# Patient Record
Sex: FEMALE | Race: WHITE | NOT HISPANIC OR LATINO | Employment: UNEMPLOYED | URBAN - METROPOLITAN AREA
[De-identification: names, ages, dates, MRNs, and addresses within clinical notes are randomized per-mention and may not be internally consistent; named-entity substitution may affect disease eponyms.]

---

## 2019-05-28 ENCOUNTER — HOSPITAL ENCOUNTER (OUTPATIENT)
Dept: MAMMOGRAPHY | Facility: HOSPITAL | Age: 74
Discharge: HOME | End: 2019-05-28
Attending: INTERNAL MEDICINE

## 2020-10-19 ENCOUNTER — HOSPITAL ENCOUNTER (OUTPATIENT)
Dept: PREADMISSION TESTING | Facility: HOSPITAL | Age: 75
Discharge: HOME | End: 2020-10-19
Attending: INTERNAL MEDICINE

## 2020-10-22 LAB — SARS-COV-2 RNA SPEC QL NAA+PROBE: NOT DETECTED

## 2020-10-23 ENCOUNTER — HOSPITAL ENCOUNTER (OUTPATIENT)
Dept: GASTROENTEROLOGY | Facility: HOSPITAL | Age: 75
Setting detail: HOSPITAL OUTPATIENT SURGERY
Discharge: HOME | End: 2020-10-23
Attending: INTERNAL MEDICINE

## 2021-02-22 ENCOUNTER — HOSPITAL ENCOUNTER (OUTPATIENT)
Dept: VACCINE CLINIC | Facility: HOSPITAL | Age: 76
Discharge: HOME | End: 2021-02-22
Attending: INTERNAL MEDICINE

## 2021-03-16 ENCOUNTER — HOSPITAL ENCOUNTER (OUTPATIENT)
Dept: MAMMOGRAPHY | Facility: HOSPITAL | Age: 76
Discharge: HOME | End: 2021-03-16
Attending: INTERNAL MEDICINE

## 2021-03-25 ENCOUNTER — HOSPITAL ENCOUNTER (OUTPATIENT)
Dept: VACCINE CLINIC | Facility: HOSPITAL | Age: 76
Discharge: HOME | End: 2021-03-25
Attending: INTERNAL MEDICINE

## 2022-05-13 ENCOUNTER — LAB (OUTPATIENT)
Dept: LAB | Facility: HOSPITAL | Age: 77
End: 2022-05-13

## 2022-05-13 ENCOUNTER — TRANSCRIBE ORDERS (OUTPATIENT)
Dept: LAB | Facility: HOSPITAL | Age: 77
End: 2022-05-13

## 2022-05-13 DIAGNOSIS — I10 ESSENTIAL HYPERTENSION, MALIGNANT: ICD-10-CM

## 2022-05-13 DIAGNOSIS — I10 ESSENTIAL HYPERTENSION, MALIGNANT: Primary | ICD-10-CM

## 2022-05-13 LAB
ANION GAP SERPL CALCULATED.3IONS-SCNC: 13 MMOL/L (ref 5–15)
BUN SERPL-MCNC: 20 MG/DL (ref 8–23)
BUN/CREAT SERPL: 19 (ref 7–25)
CALCIUM SPEC-SCNC: 9.6 MG/DL (ref 8.6–10.5)
CHLORIDE SERPL-SCNC: 99 MMOL/L (ref 98–107)
CO2 SERPL-SCNC: 25 MMOL/L (ref 22–29)
CREAT SERPL-MCNC: 1.05 MG/DL (ref 0.57–1)
EGFRCR SERPLBLD CKD-EPI 2021: 55.5 ML/MIN/1.73
GLUCOSE SERPL-MCNC: 95 MG/DL (ref 65–99)
POTASSIUM SERPL-SCNC: 4.7 MMOL/L (ref 3.5–5.2)
SODIUM SERPL-SCNC: 137 MMOL/L (ref 136–145)

## 2022-05-13 PROCEDURE — 36415 COLL VENOUS BLD VENIPUNCTURE: CPT

## 2022-05-13 PROCEDURE — 80048 BASIC METABOLIC PNL TOTAL CA: CPT

## 2023-03-09 ENCOUNTER — TRANSCRIBE ORDERS (OUTPATIENT)
Dept: ADMINISTRATIVE | Facility: HOSPITAL | Age: 78
End: 2023-03-09
Payer: MEDICARE

## 2023-03-09 DIAGNOSIS — Z92.89 HISTORY OF MAMMOGRAPHY, SCREENING: Primary | ICD-10-CM

## 2023-03-10 ENCOUNTER — TRANSCRIBE ORDERS (OUTPATIENT)
Dept: ADMINISTRATIVE | Facility: HOSPITAL | Age: 78
End: 2023-03-10
Payer: MEDICARE

## 2023-03-10 DIAGNOSIS — Z12.31 SCREENING MAMMOGRAM FOR BREAST CANCER: Primary | ICD-10-CM

## 2023-03-13 ENCOUNTER — HOSPITAL ENCOUNTER (OUTPATIENT)
Dept: MAMMOGRAPHY | Facility: HOSPITAL | Age: 78
Discharge: HOME | End: 2023-03-13
Admitting: INTERNAL MEDICINE
Payer: MEDICARE

## 2023-03-13 DIAGNOSIS — Z12.31 SCREENING MAMMOGRAM FOR BREAST CANCER: ICD-10-CM

## 2023-03-13 PROCEDURE — 77067 SCR MAMMO BI INCL CAD: CPT

## 2023-03-13 PROCEDURE — 77063 BREAST TOMOSYNTHESIS BI: CPT

## 2023-10-19 ENCOUNTER — TRANSCRIBE ORDERS (OUTPATIENT)
Dept: ADMINISTRATIVE | Facility: HOSPITAL | Age: 78
End: 2023-10-19
Payer: MEDICARE

## 2023-10-19 DIAGNOSIS — Z13.820 OSTEOPOROSIS SCREENING: Primary | ICD-10-CM

## 2023-12-12 ENCOUNTER — TRANSCRIBE ORDERS (OUTPATIENT)
Dept: ADMINISTRATIVE | Facility: HOSPITAL | Age: 78
End: 2023-12-12
Payer: MEDICARE

## 2023-12-12 DIAGNOSIS — M81.0 AGE-RELATED OSTEOPOROSIS WITHOUT CURRENT PATHOLOGICAL FRACTURE: Primary | ICD-10-CM

## 2023-12-14 ENCOUNTER — HOSPITAL ENCOUNTER (OUTPATIENT)
Dept: BONE DENSITY | Facility: HOSPITAL | Age: 78
Discharge: HOME | End: 2023-12-14
Admitting: INTERNAL MEDICINE
Payer: MEDICARE

## 2023-12-14 DIAGNOSIS — M81.0 AGE-RELATED OSTEOPOROSIS WITHOUT CURRENT PATHOLOGICAL FRACTURE: ICD-10-CM

## 2023-12-14 PROCEDURE — 77080 DXA BONE DENSITY AXIAL: CPT

## 2025-05-10 ENCOUNTER — HOSPITAL ENCOUNTER (INPATIENT)
Facility: HOSPITAL | Age: 80
LOS: 6 days | Discharge: NURSING, REHAB OR LONG-TERM CARE | DRG: 641 | End: 2025-05-16
Attending: EMERGENCY MEDICINE | Admitting: INTERNAL MEDICINE
Payer: MEDICARE

## 2025-05-10 ENCOUNTER — APPOINTMENT (OUTPATIENT)
Dept: GENERAL RADIOLOGY | Facility: HOSPITAL | Age: 80
DRG: 641 | End: 2025-05-10
Payer: MEDICARE

## 2025-05-10 ENCOUNTER — APPOINTMENT (OUTPATIENT)
Dept: CT IMAGING | Facility: HOSPITAL | Age: 80
DRG: 641 | End: 2025-05-10
Payer: MEDICARE

## 2025-05-10 DIAGNOSIS — G61.81 CIDP (CHRONIC INFLAMMATORY DEMYELINATING POLYNEUROPATHY): Primary | ICD-10-CM

## 2025-05-10 DIAGNOSIS — E87.1 HYPONATREMIA: ICD-10-CM

## 2025-05-10 DIAGNOSIS — N17.9 AKI (ACUTE KIDNEY INJURY): ICD-10-CM

## 2025-05-10 DIAGNOSIS — R26.2 DIFFICULTY WALKING: ICD-10-CM

## 2025-05-10 DIAGNOSIS — Z78.9 IMPAIRED MOBILITY AND ADLS: ICD-10-CM

## 2025-05-10 DIAGNOSIS — Z74.09 IMPAIRED MOBILITY AND ADLS: ICD-10-CM

## 2025-05-10 PROBLEM — E87.5 HYPERKALEMIA: Status: ACTIVE | Noted: 2025-05-10

## 2025-05-10 PROBLEM — E86.0 DEHYDRATION: Status: ACTIVE | Noted: 2025-05-10

## 2025-05-10 LAB
ALBUMIN SERPL-MCNC: 3.2 G/DL (ref 3.5–5.2)
ALBUMIN/GLOB SERPL: 0.7 G/DL
ALP SERPL-CCNC: 93 U/L (ref 39–117)
ALT SERPL W P-5'-P-CCNC: 20 U/L (ref 1–33)
ANION GAP SERPL CALCULATED.3IONS-SCNC: 15.9 MMOL/L (ref 5–15)
AST SERPL-CCNC: 24 U/L (ref 1–32)
BACTERIA UR QL AUTO: ABNORMAL /HPF
BASOPHILS # BLD AUTO: 0.06 10*3/MM3 (ref 0–0.2)
BASOPHILS NFR BLD AUTO: 0.5 % (ref 0–1.5)
BILIRUB SERPL-MCNC: 0.8 MG/DL (ref 0–1.2)
BILIRUB UR QL STRIP: NEGATIVE
BUN SERPL-MCNC: 49 MG/DL (ref 8–23)
BUN/CREAT SERPL: 36 (ref 7–25)
CALCIUM SPEC-SCNC: 10.2 MG/DL (ref 8.6–10.5)
CHLORIDE SERPL-SCNC: 88 MMOL/L (ref 98–107)
CK SERPL-CCNC: 75 U/L (ref 20–180)
CLARITY UR: ABNORMAL
CO2 SERPL-SCNC: 22.1 MMOL/L (ref 22–29)
COLOR UR: YELLOW
CREAT SERPL-MCNC: 1.36 MG/DL (ref 0.57–1)
D-LACTATE SERPL-SCNC: 1.2 MMOL/L (ref 0.5–2)
DEPRECATED RDW RBC AUTO: 41.4 FL (ref 37–54)
EGFRCR SERPLBLD CKD-EPI 2021: 40 ML/MIN/1.73
EOSINOPHIL # BLD AUTO: 0.57 10*3/MM3 (ref 0–0.4)
EOSINOPHIL NFR BLD AUTO: 4.8 % (ref 0.3–6.2)
ERYTHROCYTE [DISTWIDTH] IN BLOOD BY AUTOMATED COUNT: 12 % (ref 12.3–15.4)
GEN 5 1HR TROPONIN T REFLEX: 11 NG/L
GLOBULIN UR ELPH-MCNC: 4.3 GM/DL
GLUCOSE SERPL-MCNC: 102 MG/DL (ref 65–99)
GLUCOSE UR STRIP-MCNC: NEGATIVE MG/DL
HCT VFR BLD AUTO: 37.6 % (ref 34–46.6)
HGB BLD-MCNC: 12.7 G/DL (ref 12–15.9)
HGB UR QL STRIP.AUTO: ABNORMAL
HYALINE CASTS UR QL AUTO: ABNORMAL /LPF
IMM GRANULOCYTES # BLD AUTO: 0.22 10*3/MM3 (ref 0–0.05)
IMM GRANULOCYTES NFR BLD AUTO: 1.9 % (ref 0–0.5)
KETONES UR QL STRIP: NEGATIVE
LEUKOCYTE ESTERASE UR QL STRIP.AUTO: ABNORMAL
LYMPHOCYTES # BLD AUTO: 0.77 10*3/MM3 (ref 0.7–3.1)
LYMPHOCYTES NFR BLD AUTO: 6.5 % (ref 19.6–45.3)
MAGNESIUM SERPL-MCNC: 2 MG/DL (ref 1.6–2.4)
MCH RBC QN AUTO: 31.5 PG (ref 26.6–33)
MCHC RBC AUTO-ENTMCNC: 33.8 G/DL (ref 31.5–35.7)
MCV RBC AUTO: 93.3 FL (ref 79–97)
MONOCYTES # BLD AUTO: 1.37 10*3/MM3 (ref 0.1–0.9)
MONOCYTES NFR BLD AUTO: 11.6 % (ref 5–12)
NEUTROPHILS NFR BLD AUTO: 74.7 % (ref 42.7–76)
NEUTROPHILS NFR BLD AUTO: 8.85 10*3/MM3 (ref 1.7–7)
NITRITE UR QL STRIP: NEGATIVE
NRBC BLD AUTO-RTO: 0 /100 WBC (ref 0–0.2)
PH UR STRIP.AUTO: <=5 [PH] (ref 5–8)
PLATELET # BLD AUTO: 256 10*3/MM3 (ref 140–450)
PMV BLD AUTO: 8.7 FL (ref 6–12)
POTASSIUM SERPL-SCNC: 5.3 MMOL/L (ref 3.5–5.2)
PROCALCITONIN SERPL-MCNC: 0.62 NG/ML (ref 0–0.25)
PROT SERPL-MCNC: 7.5 G/DL (ref 6–8.5)
PROT UR QL STRIP: NEGATIVE
QT INTERVAL: 340 MS
QTC INTERVAL: 395 MS
RBC # BLD AUTO: 4.03 10*6/MM3 (ref 3.77–5.28)
RBC # UR STRIP: ABNORMAL /HPF
REF LAB TEST METHOD: ABNORMAL
SODIUM SERPL-SCNC: 126 MMOL/L (ref 136–145)
SP GR UR STRIP: 1.01 (ref 1–1.03)
SQUAMOUS #/AREA URNS HPF: ABNORMAL /HPF
TROPONIN T NUMERIC DELTA: -1 NG/L
TROPONIN T SERPL HS-MCNC: 12 NG/L
TSH SERPL DL<=0.05 MIU/L-ACNC: 1.34 UIU/ML (ref 0.27–4.2)
UROBILINOGEN UR QL STRIP: ABNORMAL
WBC # UR STRIP: ABNORMAL /HPF
WBC NRBC COR # BLD AUTO: 11.84 10*3/MM3 (ref 3.4–10.8)

## 2025-05-10 PROCEDURE — 99285 EMERGENCY DEPT VISIT HI MDM: CPT

## 2025-05-10 PROCEDURE — 84443 ASSAY THYROID STIM HORMONE: CPT | Performed by: INTERNAL MEDICINE

## 2025-05-10 PROCEDURE — 82550 ASSAY OF CK (CPK): CPT

## 2025-05-10 PROCEDURE — 25010000002 HYDROMORPHONE 1 MG/ML SOLUTION: Performed by: INTERNAL MEDICINE

## 2025-05-10 PROCEDURE — 25810000003 SODIUM CHLORIDE 0.9 % SOLUTION

## 2025-05-10 PROCEDURE — 72100 X-RAY EXAM L-S SPINE 2/3 VWS: CPT

## 2025-05-10 PROCEDURE — 25810000003 SODIUM CHLORIDE 0.9 % SOLUTION: Performed by: INTERNAL MEDICINE

## 2025-05-10 PROCEDURE — 81001 URINALYSIS AUTO W/SCOPE: CPT

## 2025-05-10 PROCEDURE — 84145 PROCALCITONIN (PCT): CPT | Performed by: INTERNAL MEDICINE

## 2025-05-10 PROCEDURE — 36415 COLL VENOUS BLD VENIPUNCTURE: CPT

## 2025-05-10 PROCEDURE — 83735 ASSAY OF MAGNESIUM: CPT

## 2025-05-10 PROCEDURE — 84484 ASSAY OF TROPONIN QUANT: CPT

## 2025-05-10 PROCEDURE — 70450 CT HEAD/BRAIN W/O DYE: CPT

## 2025-05-10 PROCEDURE — 85025 COMPLETE CBC W/AUTO DIFF WBC: CPT

## 2025-05-10 PROCEDURE — 80053 COMPREHEN METABOLIC PANEL: CPT

## 2025-05-10 PROCEDURE — 93005 ELECTROCARDIOGRAM TRACING: CPT

## 2025-05-10 PROCEDURE — 81001 URINALYSIS AUTO W/SCOPE: CPT | Performed by: INTERNAL MEDICINE

## 2025-05-10 PROCEDURE — 83605 ASSAY OF LACTIC ACID: CPT | Performed by: INTERNAL MEDICINE

## 2025-05-10 RX ORDER — ACETAMINOPHEN 160 MG/5ML
650 SOLUTION ORAL EVERY 4 HOURS PRN
Status: DISCONTINUED | OUTPATIENT
Start: 2025-05-10 | End: 2025-05-16 | Stop reason: HOSPADM

## 2025-05-10 RX ORDER — BISACODYL 10 MG
10 SUPPOSITORY, RECTAL RECTAL DAILY PRN
Status: DISCONTINUED | OUTPATIENT
Start: 2025-05-10 | End: 2025-05-16 | Stop reason: HOSPADM

## 2025-05-10 RX ORDER — ACETAMINOPHEN 650 MG/1
650 SUPPOSITORY RECTAL EVERY 4 HOURS PRN
Status: DISCONTINUED | OUTPATIENT
Start: 2025-05-10 | End: 2025-05-16 | Stop reason: HOSPADM

## 2025-05-10 RX ORDER — SODIUM CHLORIDE 9 MG/ML
40 INJECTION, SOLUTION INTRAVENOUS AS NEEDED
Status: DISCONTINUED | OUTPATIENT
Start: 2025-05-10 | End: 2025-05-16 | Stop reason: HOSPADM

## 2025-05-10 RX ORDER — AMOXICILLIN 250 MG
2 CAPSULE ORAL 2 TIMES DAILY PRN
Status: DISCONTINUED | OUTPATIENT
Start: 2025-05-10 | End: 2025-05-16 | Stop reason: HOSPADM

## 2025-05-10 RX ORDER — SODIUM CHLORIDE 0.9 % (FLUSH) 0.9 %
10 SYRINGE (ML) INJECTION EVERY 12 HOURS SCHEDULED
Status: DISCONTINUED | OUTPATIENT
Start: 2025-05-10 | End: 2025-05-16 | Stop reason: HOSPADM

## 2025-05-10 RX ORDER — ATENOLOL 25 MG/1
25 TABLET ORAL EVERY 12 HOURS SCHEDULED
Status: DISCONTINUED | OUTPATIENT
Start: 2025-05-10 | End: 2025-05-16 | Stop reason: HOSPADM

## 2025-05-10 RX ORDER — NALOXONE HCL 0.4 MG/ML
0.4 VIAL (ML) INJECTION
Status: DISCONTINUED | OUTPATIENT
Start: 2025-05-10 | End: 2025-05-16 | Stop reason: HOSPADM

## 2025-05-10 RX ORDER — ACETAMINOPHEN 325 MG/1
650 TABLET ORAL EVERY 4 HOURS PRN
Status: DISCONTINUED | OUTPATIENT
Start: 2025-05-10 | End: 2025-05-16 | Stop reason: HOSPADM

## 2025-05-10 RX ORDER — ALUMINA, MAGNESIA, AND SIMETHICONE 2400; 2400; 240 MG/30ML; MG/30ML; MG/30ML
15 SUSPENSION ORAL EVERY 6 HOURS PRN
Status: DISCONTINUED | OUTPATIENT
Start: 2025-05-10 | End: 2025-05-16 | Stop reason: HOSPADM

## 2025-05-10 RX ORDER — POLYETHYLENE GLYCOL 3350 17 G/17G
17 POWDER, FOR SOLUTION ORAL DAILY PRN
Status: DISCONTINUED | OUTPATIENT
Start: 2025-05-10 | End: 2025-05-16 | Stop reason: HOSPADM

## 2025-05-10 RX ORDER — SODIUM CHLORIDE 9 MG/ML
100 INJECTION, SOLUTION INTRAVENOUS CONTINUOUS
Status: ACTIVE | OUTPATIENT
Start: 2025-05-10 | End: 2025-05-12

## 2025-05-10 RX ORDER — ONDANSETRON 2 MG/ML
4 INJECTION INTRAMUSCULAR; INTRAVENOUS EVERY 4 HOURS PRN
Status: DISCONTINUED | OUTPATIENT
Start: 2025-05-10 | End: 2025-05-16 | Stop reason: HOSPADM

## 2025-05-10 RX ORDER — SODIUM CHLORIDE 0.9 % (FLUSH) 0.9 %
10 SYRINGE (ML) INJECTION AS NEEDED
Status: DISCONTINUED | OUTPATIENT
Start: 2025-05-10 | End: 2025-05-16 | Stop reason: HOSPADM

## 2025-05-10 RX ORDER — FAMOTIDINE 20 MG/1
40 TABLET, FILM COATED ORAL DAILY
Status: DISCONTINUED | OUTPATIENT
Start: 2025-05-11 | End: 2025-05-16 | Stop reason: HOSPADM

## 2025-05-10 RX ORDER — BISACODYL 5 MG/1
5 TABLET, DELAYED RELEASE ORAL DAILY PRN
Status: DISCONTINUED | OUTPATIENT
Start: 2025-05-10 | End: 2025-05-16 | Stop reason: HOSPADM

## 2025-05-10 RX ORDER — ALPRAZOLAM 0.25 MG
0.25 TABLET ORAL EVERY 6 HOURS PRN
Status: DISCONTINUED | OUTPATIENT
Start: 2025-05-10 | End: 2025-05-16 | Stop reason: HOSPADM

## 2025-05-10 RX ORDER — TEMAZEPAM 15 MG/1
15 CAPSULE ORAL NIGHTLY PRN
Status: DISCONTINUED | OUTPATIENT
Start: 2025-05-10 | End: 2025-05-16 | Stop reason: HOSPADM

## 2025-05-10 RX ORDER — ENOXAPARIN SODIUM 100 MG/ML
40 INJECTION SUBCUTANEOUS DAILY
Status: DISCONTINUED | OUTPATIENT
Start: 2025-05-11 | End: 2025-05-16 | Stop reason: HOSPADM

## 2025-05-10 RX ORDER — HYDROCODONE BITARTRATE AND ACETAMINOPHEN 5; 325 MG/1; MG/1
1 TABLET ORAL EVERY 6 HOURS PRN
Status: DISPENSED | OUTPATIENT
Start: 2025-05-10 | End: 2025-05-15

## 2025-05-10 RX ADMIN — Medication 10 ML: at 22:57

## 2025-05-10 RX ADMIN — HYDROMORPHONE HYDROCHLORIDE 0.5 MG: 1 INJECTION, SOLUTION INTRAMUSCULAR; INTRAVENOUS; SUBCUTANEOUS at 23:04

## 2025-05-10 RX ADMIN — ATENOLOL 25 MG: 25 TABLET ORAL at 23:31

## 2025-05-10 RX ADMIN — SODIUM CHLORIDE 1000 ML: 9 INJECTION, SOLUTION INTRAVENOUS at 21:15

## 2025-05-10 RX ADMIN — SODIUM CHLORIDE 100 ML/HR: 9 INJECTION, SOLUTION INTRAVENOUS at 22:55

## 2025-05-11 ENCOUNTER — APPOINTMENT (OUTPATIENT)
Dept: MRI IMAGING | Facility: HOSPITAL | Age: 80
DRG: 641 | End: 2025-05-11
Payer: MEDICARE

## 2025-05-11 LAB
ALBUMIN SERPL-MCNC: 2.7 G/DL (ref 3.5–5.2)
ALBUMIN/GLOB SERPL: 0.7 G/DL
ALP SERPL-CCNC: 84 U/L (ref 39–117)
ALT SERPL W P-5'-P-CCNC: 17 U/L (ref 1–33)
ANION GAP SERPL CALCULATED.3IONS-SCNC: 12.4 MMOL/L (ref 5–15)
AST SERPL-CCNC: 18 U/L (ref 1–32)
BACTERIA UR QL AUTO: ABNORMAL /HPF
BASOPHILS # BLD AUTO: 0.06 10*3/MM3 (ref 0–0.2)
BASOPHILS # BLD AUTO: 0.07 10*3/MM3 (ref 0–0.2)
BASOPHILS NFR BLD AUTO: 0.6 % (ref 0–1.5)
BASOPHILS NFR BLD AUTO: 0.7 % (ref 0–1.5)
BILIRUB SERPL-MCNC: 0.6 MG/DL (ref 0–1.2)
BILIRUB UR QL STRIP: NEGATIVE
BUN SERPL-MCNC: 40 MG/DL (ref 8–23)
BUN/CREAT SERPL: 33.3 (ref 7–25)
CALCIUM SPEC-SCNC: 9.4 MG/DL (ref 8.6–10.5)
CHLORIDE SERPL-SCNC: 93 MMOL/L (ref 98–107)
CK SERPL-CCNC: 75 U/L (ref 20–180)
CLARITY UR: CLEAR
CO2 SERPL-SCNC: 22.6 MMOL/L (ref 22–29)
COLOR UR: YELLOW
CREAT SERPL-MCNC: 1.2 MG/DL (ref 0.57–1)
CRP SERPL-MCNC: 45.9 MG/DL (ref 0–0.5)
D DIMER PPP FEU-MCNC: 5.4 MCGFEU/ML (ref 0–0.78)
DEPRECATED RDW RBC AUTO: 42.8 FL (ref 37–54)
DEPRECATED RDW RBC AUTO: 44.2 FL (ref 37–54)
EGFRCR SERPLBLD CKD-EPI 2021: 46.4 ML/MIN/1.73
EOSINOPHIL # BLD AUTO: 0.01 10*3/MM3 (ref 0–0.4)
EOSINOPHIL # BLD AUTO: 0.02 10*3/MM3 (ref 0–0.4)
EOSINOPHIL NFR BLD AUTO: 0.1 % (ref 0.3–6.2)
EOSINOPHIL NFR BLD AUTO: 0.2 % (ref 0.3–6.2)
ERYTHROCYTE [DISTWIDTH] IN BLOOD BY AUTOMATED COUNT: 12.2 % (ref 12.3–15.4)
ERYTHROCYTE [DISTWIDTH] IN BLOOD BY AUTOMATED COUNT: 12.5 % (ref 12.3–15.4)
GLOBULIN UR ELPH-MCNC: 3.8 GM/DL
GLUCOSE SERPL-MCNC: 93 MG/DL (ref 65–99)
GLUCOSE UR STRIP-MCNC: NEGATIVE MG/DL
HCT VFR BLD AUTO: 35.7 % (ref 34–46.6)
HCT VFR BLD AUTO: 36 % (ref 34–46.6)
HGB BLD-MCNC: 11.7 G/DL (ref 12–15.9)
HGB BLD-MCNC: 11.8 G/DL (ref 12–15.9)
HGB UR QL STRIP.AUTO: ABNORMAL
HYALINE CASTS UR QL AUTO: ABNORMAL /LPF
IMM GRANULOCYTES # BLD AUTO: 0.17 10*3/MM3 (ref 0–0.05)
IMM GRANULOCYTES # BLD AUTO: 0.24 10*3/MM3 (ref 0–0.05)
IMM GRANULOCYTES NFR BLD AUTO: 1.6 % (ref 0–0.5)
IMM GRANULOCYTES NFR BLD AUTO: 2.4 % (ref 0–0.5)
KETONES UR QL STRIP: NEGATIVE
LEUKOCYTE ESTERASE UR QL STRIP.AUTO: ABNORMAL
LYMPHOCYTES # BLD AUTO: 0.85 10*3/MM3 (ref 0.7–3.1)
LYMPHOCYTES # BLD AUTO: 0.86 10*3/MM3 (ref 0.7–3.1)
LYMPHOCYTES NFR BLD AUTO: 8.2 % (ref 19.6–45.3)
LYMPHOCYTES NFR BLD AUTO: 8.3 % (ref 19.6–45.3)
MCH RBC QN AUTO: 31.3 PG (ref 26.6–33)
MCH RBC QN AUTO: 31.5 PG (ref 26.6–33)
MCHC RBC AUTO-ENTMCNC: 32.5 G/DL (ref 31.5–35.7)
MCHC RBC AUTO-ENTMCNC: 33.1 G/DL (ref 31.5–35.7)
MCV RBC AUTO: 94.7 FL (ref 79–97)
MCV RBC AUTO: 96.8 FL (ref 79–97)
MONOCYTES # BLD AUTO: 1.31 10*3/MM3 (ref 0.1–0.9)
MONOCYTES # BLD AUTO: 1.42 10*3/MM3 (ref 0.1–0.9)
MONOCYTES NFR BLD AUTO: 12.8 % (ref 5–12)
MONOCYTES NFR BLD AUTO: 13.5 % (ref 5–12)
NEUTROPHILS NFR BLD AUTO: 7.73 10*3/MM3 (ref 1.7–7)
NEUTROPHILS NFR BLD AUTO: 75.8 % (ref 42.7–76)
NEUTROPHILS NFR BLD AUTO: 75.8 % (ref 42.7–76)
NEUTROPHILS NFR BLD AUTO: 8 10*3/MM3 (ref 1.7–7)
NITRITE UR QL STRIP: NEGATIVE
NRBC BLD AUTO-RTO: 0 /100 WBC (ref 0–0.2)
NRBC BLD AUTO-RTO: 0 /100 WBC (ref 0–0.2)
PH UR STRIP.AUTO: <=5 [PH] (ref 5–8)
PLATELET # BLD AUTO: 258 10*3/MM3 (ref 140–450)
PLATELET # BLD AUTO: 271 10*3/MM3 (ref 140–450)
PMV BLD AUTO: 8.7 FL (ref 6–12)
PMV BLD AUTO: 8.8 FL (ref 6–12)
POTASSIUM SERPL-SCNC: 4.8 MMOL/L (ref 3.5–5.2)
PROT SERPL-MCNC: 6.5 G/DL (ref 6–8.5)
PROT UR QL STRIP: NEGATIVE
RBC # BLD AUTO: 3.72 10*6/MM3 (ref 3.77–5.28)
RBC # BLD AUTO: 3.77 10*6/MM3 (ref 3.77–5.28)
RBC # UR STRIP: ABNORMAL /HPF
REF LAB TEST METHOD: ABNORMAL
SODIUM SERPL-SCNC: 128 MMOL/L (ref 136–145)
SP GR UR STRIP: 1.01 (ref 1–1.03)
SQUAMOUS #/AREA URNS HPF: ABNORMAL /HPF
T4 FREE SERPL-MCNC: 1.37 NG/DL (ref 0.92–1.68)
UROBILINOGEN UR QL STRIP: ABNORMAL
WBC # UR STRIP: ABNORMAL /HPF
WBC NRBC COR # BLD AUTO: 10.2 10*3/MM3 (ref 3.4–10.8)
WBC NRBC COR # BLD AUTO: 10.54 10*3/MM3 (ref 3.4–10.8)

## 2025-05-11 PROCEDURE — 86235 NUCLEAR ANTIGEN ANTIBODY: CPT | Performed by: PSYCHIATRY & NEUROLOGY

## 2025-05-11 PROCEDURE — 87798 DETECT AGENT NOS DNA AMP: CPT | Performed by: PSYCHIATRY & NEUROLOGY

## 2025-05-11 PROCEDURE — 25010000002 IMMUNE GLOBULIN (HUMAN) 20 GM/200ML SOLUTION 200 ML VIAL: Performed by: PSYCHIATRY & NEUROLOGY

## 2025-05-11 PROCEDURE — 86038 ANTINUCLEAR ANTIBODIES: CPT | Performed by: PSYCHIATRY & NEUROLOGY

## 2025-05-11 PROCEDURE — 86003 ALLG SPEC IGE CRUDE XTRC EA: CPT | Performed by: PSYCHIATRY & NEUROLOGY

## 2025-05-11 PROCEDURE — 86769 SARS-COV-2 COVID-19 ANTIBODY: CPT | Performed by: PSYCHIATRY & NEUROLOGY

## 2025-05-11 PROCEDURE — 82550 ASSAY OF CK (CPK): CPT | Performed by: PSYCHIATRY & NEUROLOGY

## 2025-05-11 PROCEDURE — 85025 COMPLETE CBC W/AUTO DIFF WBC: CPT | Performed by: PSYCHIATRY & NEUROLOGY

## 2025-05-11 PROCEDURE — 70551 MRI BRAIN STEM W/O DYE: CPT

## 2025-05-11 PROCEDURE — 82378 CARCINOEMBRYONIC ANTIGEN: CPT | Performed by: PSYCHIATRY & NEUROLOGY

## 2025-05-11 PROCEDURE — 72141 MRI NECK SPINE W/O DYE: CPT

## 2025-05-11 PROCEDURE — 85025 COMPLETE CBC W/AUTO DIFF WBC: CPT | Performed by: INTERNAL MEDICINE

## 2025-05-11 PROCEDURE — 86140 C-REACTIVE PROTEIN: CPT | Performed by: PSYCHIATRY & NEUROLOGY

## 2025-05-11 PROCEDURE — 86431 RHEUMATOID FACTOR QUANT: CPT | Performed by: PSYCHIATRY & NEUROLOGY

## 2025-05-11 PROCEDURE — 30233S1 TRANSFUSION OF NONAUTOLOGOUS GLOBULIN INTO PERIPHERAL VEIN, PERCUTANEOUS APPROACH: ICD-10-PCS | Performed by: PSYCHIATRY & NEUROLOGY

## 2025-05-11 PROCEDURE — 25810000003 SODIUM CHLORIDE 0.9 % SOLUTION: Performed by: INTERNAL MEDICINE

## 2025-05-11 PROCEDURE — 25010000002 IMMUNE GLOBULIN (HUMAN) 10 GM/100ML SOLUTION 100 ML VIAL: Performed by: PSYCHIATRY & NEUROLOGY

## 2025-05-11 PROCEDURE — 82784 ASSAY IGA/IGD/IGG/IGM EACH: CPT | Performed by: PSYCHIATRY & NEUROLOGY

## 2025-05-11 PROCEDURE — 80053 COMPREHEN METABOLIC PANEL: CPT | Performed by: INTERNAL MEDICINE

## 2025-05-11 PROCEDURE — 86664 EPSTEIN-BARR NUCLEAR ANTIGEN: CPT | Performed by: PSYCHIATRY & NEUROLOGY

## 2025-05-11 PROCEDURE — 25810000003 SODIUM CHLORIDE 0.9 % SOLUTION 500 ML FLEX CONT: Performed by: INTERNAL MEDICINE

## 2025-05-11 PROCEDURE — 83655 ASSAY OF LEAD: CPT | Performed by: PSYCHIATRY & NEUROLOGY

## 2025-05-11 PROCEDURE — 87484 EHRLICHA CHAFFEENSIS AMP PRB: CPT | Performed by: PSYCHIATRY & NEUROLOGY

## 2025-05-11 PROCEDURE — 82607 VITAMIN B-12: CPT | Performed by: PSYCHIATRY & NEUROLOGY

## 2025-05-11 PROCEDURE — 86593 SYPHILIS TEST NON-TREP QUANT: CPT | Performed by: PSYCHIATRY & NEUROLOGY

## 2025-05-11 PROCEDURE — 86611 BARTONELLA ANTIBODY: CPT | Performed by: PSYCHIATRY & NEUROLOGY

## 2025-05-11 PROCEDURE — 25010000002 HYDROMORPHONE 1 MG/ML SOLUTION: Performed by: INTERNAL MEDICINE

## 2025-05-11 PROCEDURE — 72148 MRI LUMBAR SPINE W/O DYE: CPT

## 2025-05-11 PROCEDURE — 82175 ASSAY OF ARSENIC: CPT | Performed by: PSYCHIATRY & NEUROLOGY

## 2025-05-11 PROCEDURE — 63710000001 DIPHENHYDRAMINE PER 50 MG: Performed by: PSYCHIATRY & NEUROLOGY

## 2025-05-11 PROCEDURE — 86757 RICKETTSIA ANTIBODY: CPT | Performed by: PSYCHIATRY & NEUROLOGY

## 2025-05-11 PROCEDURE — 25010000002 ENOXAPARIN PER 10 MG: Performed by: INTERNAL MEDICINE

## 2025-05-11 PROCEDURE — 87469 BABESIA MICROTI AMP PRB: CPT | Performed by: PSYCHIATRY & NEUROLOGY

## 2025-05-11 PROCEDURE — 83825 ASSAY OF MERCURY: CPT | Performed by: PSYCHIATRY & NEUROLOGY

## 2025-05-11 PROCEDURE — 84439 ASSAY OF FREE THYROXINE: CPT | Performed by: PSYCHIATRY & NEUROLOGY

## 2025-05-11 PROCEDURE — 85379 FIBRIN DEGRADATION QUANT: CPT | Performed by: PSYCHIATRY & NEUROLOGY

## 2025-05-11 PROCEDURE — 72146 MRI CHEST SPINE W/O DYE: CPT

## 2025-05-11 RX ORDER — FAMOTIDINE 10 MG/ML
20 INJECTION, SOLUTION INTRAVENOUS DAILY PRN
Status: ACTIVE | OUTPATIENT
Start: 2025-05-11 | End: 2025-05-15

## 2025-05-11 RX ORDER — PHENOL 1.4 %
600 AEROSOL, SPRAY (ML) MUCOUS MEMBRANE DAILY
COMMUNITY

## 2025-05-11 RX ORDER — ATENOLOL 25 MG/1
50 TABLET ORAL NIGHTLY
COMMUNITY
End: 2025-05-16 | Stop reason: HOSPADM

## 2025-05-11 RX ORDER — ATENOLOL 25 MG/1
25 TABLET ORAL EVERY MORNING
COMMUNITY
End: 2025-05-16 | Stop reason: HOSPADM

## 2025-05-11 RX ORDER — HYDROXYCHLOROQUINE SULFATE 200 MG/1
200 TABLET, FILM COATED ORAL DAILY
Status: DISCONTINUED | OUTPATIENT
Start: 2025-05-11 | End: 2025-05-16 | Stop reason: HOSPADM

## 2025-05-11 RX ORDER — HYDROXYCHLOROQUINE SULFATE 200 MG/1
200 TABLET, FILM COATED ORAL DAILY
COMMUNITY

## 2025-05-11 RX ORDER — CALCIUM CARBONATE/VITAMIN D3 600 MG-10
1 TABLET ORAL 2 TIMES DAILY WITH MEALS
Status: DISCONTINUED | OUTPATIENT
Start: 2025-05-11 | End: 2025-05-16 | Stop reason: HOSPADM

## 2025-05-11 RX ORDER — DIPHENHYDRAMINE HYDROCHLORIDE 50 MG/ML
50 INJECTION, SOLUTION INTRAMUSCULAR; INTRAVENOUS DAILY PRN
Status: ACTIVE | OUTPATIENT
Start: 2025-05-11 | End: 2025-05-15

## 2025-05-11 RX ORDER — DEXAMETHASONE SODIUM PHOSPHATE 10 MG/ML
10 INJECTION, SOLUTION INTRAMUSCULAR; INTRAVENOUS DAILY PRN
Status: ACTIVE | OUTPATIENT
Start: 2025-05-11 | End: 2025-05-15

## 2025-05-11 RX ORDER — ACETAMINOPHEN 325 MG/1
650 TABLET ORAL DAILY
Status: COMPLETED | OUTPATIENT
Start: 2025-05-11 | End: 2025-05-14

## 2025-05-11 RX ORDER — CHOLECALCIFEROL (VITAMIN D3) 25 MCG
1000 TABLET ORAL
COMMUNITY

## 2025-05-11 RX ORDER — AMLODIPINE BESYLATE 5 MG/1
5 TABLET ORAL DAILY
COMMUNITY

## 2025-05-11 RX ORDER — DIPHENHYDRAMINE HCL 25 MG
25 CAPSULE ORAL DAILY
Status: COMPLETED | OUTPATIENT
Start: 2025-05-11 | End: 2025-05-14

## 2025-05-11 RX ORDER — SENNOSIDES 8.6 MG
650 CAPSULE ORAL 2 TIMES DAILY
COMMUNITY

## 2025-05-11 RX ORDER — CYCLOBENZAPRINE HCL 5 MG
2.5 TABLET ORAL NIGHTLY
COMMUNITY
Start: 2025-05-07

## 2025-05-11 RX ORDER — MULTIPLE VITAMINS W/ MINERALS TAB 9MG-400MCG
1 TAB ORAL DAILY
COMMUNITY

## 2025-05-11 RX ORDER — LISINOPRIL 40 MG/1
1 TABLET ORAL DAILY
COMMUNITY
Start: 2025-05-06

## 2025-05-11 RX ORDER — SPIRONOLACTONE 25 MG/1
0.5 TABLET ORAL DAILY
COMMUNITY
Start: 2025-03-02 | End: 2025-05-16 | Stop reason: HOSPADM

## 2025-05-11 RX ADMIN — HYDROCODONE BITARTRATE AND ACETAMINOPHEN 1 TABLET: 5; 325 TABLET ORAL at 05:31

## 2025-05-11 RX ADMIN — DIPHENHYDRAMINE HYDROCHLORIDE 25 MG: 25 CAPSULE ORAL at 18:46

## 2025-05-11 RX ADMIN — ATENOLOL 25 MG: 25 TABLET ORAL at 21:54

## 2025-05-11 RX ADMIN — HYDROMORPHONE HYDROCHLORIDE 0.5 MG: 1 INJECTION, SOLUTION INTRAMUSCULAR; INTRAVENOUS; SUBCUTANEOUS at 15:31

## 2025-05-11 RX ADMIN — ENOXAPARIN SODIUM 40 MG: 100 INJECTION SUBCUTANEOUS at 08:31

## 2025-05-11 RX ADMIN — HYDROXYCHLOROQUINE SULFATE 200 MG: 200 TABLET ORAL at 14:37

## 2025-05-11 RX ADMIN — FAMOTIDINE 40 MG: 20 TABLET, FILM COATED ORAL at 08:31

## 2025-05-11 RX ADMIN — ACETAMINOPHEN 650 MG: 325 TABLET ORAL at 18:46

## 2025-05-11 RX ADMIN — Medication 1 TABLET: at 18:46

## 2025-05-11 RX ADMIN — SODIUM CHLORIDE 40 ML: 9 INJECTION, SOLUTION INTRAVENOUS at 20:04

## 2025-05-11 RX ADMIN — Medication 10 ML: at 08:32

## 2025-05-11 RX ADMIN — Medication 10 ML: at 20:03

## 2025-05-11 RX ADMIN — IMMUNE GLOBULIN (HUMAN) 30 G: 10 INJECTION INTRAVENOUS; SUBCUTANEOUS at 20:04

## 2025-05-11 RX ADMIN — HYDROMORPHONE HYDROCHLORIDE 0.5 MG: 1 INJECTION, SOLUTION INTRAMUSCULAR; INTRAVENOUS; SUBCUTANEOUS at 12:52

## 2025-05-11 RX ADMIN — SODIUM CHLORIDE 100 ML/HR: 9 INJECTION, SOLUTION INTRAVENOUS at 10:44

## 2025-05-11 RX ADMIN — HYDROMORPHONE HYDROCHLORIDE 0.5 MG: 1 INJECTION, SOLUTION INTRAMUSCULAR; INTRAVENOUS; SUBCUTANEOUS at 02:23

## 2025-05-11 NOTE — ED PROVIDER NOTES
Time: 9:38 PM EDT  Date of encounter:  5/10/2025  Independent Historian/Clinical History and Information was obtained by:   Patient    History is limited by: N/A    Chief Complaint: muscle weakness/cramps      History of Present Illness:  Patient is a 79 y.o. year old female who presents to the emergency department for evaluation of bilateral lower extremity cramping and weakness that began 3 days ago.  Patient states she was diagnosed with UTI last Thursday and started on antibiotics.  Patient denies dysuria at this time.  Patient states  started her on a muscle relaxer but it has not helped.      Patient Care Team  Primary Care Provider: Fredy Lopez MD    Past Medical History:     No Known Allergies  Past Medical History:   Diagnosis Date    High blood pressure      Past Surgical History:   Procedure Laterality Date    APPENDECTOMY      CHOLECYSTECTOMY      COLONOSCOPY  2004    Dr. Watters    HYSTERECTOMY       Family History   Problem Relation Age of Onset    Cancer Brother         passed away from cancer unknown    Crohn's disease Brother         brother passed away from crohns disease       Home Medications:  Prior to Admission medications    Not on File        Social History:   Social History     Tobacco Use    Smoking status: Never         Review of Systems:  Review of Systems   Constitutional:  Negative for chills and fever.   HENT:  Negative for congestion, rhinorrhea and sore throat.    Eyes:  Negative for pain and visual disturbance.   Respiratory:  Negative for apnea, cough, chest tightness and shortness of breath.    Cardiovascular:  Negative for chest pain and palpitations.   Gastrointestinal:  Negative for abdominal pain, diarrhea, nausea and vomiting.   Genitourinary:  Negative for difficulty urinating and dysuria.   Musculoskeletal:  Positive for myalgias. Negative for joint swelling.   Skin:  Negative for color change.   Neurological:  Negative for seizures and headaches.  "  Psychiatric/Behavioral: Negative.     All other systems reviewed and are negative.       Physical Exam:  /65 (BP Location: Right arm, Patient Position: Lying)   Pulse 81   Temp 97.8 °F (36.6 °C)   Resp 17   Ht 167.6 cm (66\")   Wt 86.3 kg (190 lb 4.1 oz)   SpO2 96%   BMI 30.71 kg/m²     Physical Exam  Vitals and nursing note reviewed.   Constitutional:       General: She is not in acute distress.     Appearance: Normal appearance. She is not toxic-appearing.   HENT:      Head: Normocephalic and atraumatic.      Jaw: There is normal jaw occlusion.   Eyes:      General: Lids are normal.      Extraocular Movements: Extraocular movements intact.      Conjunctiva/sclera: Conjunctivae normal.      Pupils: Pupils are equal, round, and reactive to light.   Cardiovascular:      Rate and Rhythm: Normal rate and regular rhythm.      Pulses: Normal pulses.      Heart sounds: Normal heart sounds.   Pulmonary:      Effort: Pulmonary effort is normal. No respiratory distress.      Breath sounds: Normal breath sounds. No wheezing or rhonchi.   Abdominal:      General: Abdomen is flat.      Palpations: Abdomen is soft.      Tenderness: There is no abdominal tenderness. There is no guarding or rebound.   Musculoskeletal:         General: Normal range of motion.      Cervical back: Normal range of motion and neck supple.      Right lower leg: No edema.      Left lower leg: No edema.   Skin:     General: Skin is warm and dry.   Neurological:      Mental Status: She is alert and oriented to person, place, and time. Mental status is at baseline.   Psychiatric:         Mood and Affect: Mood normal.                    Medical Decision Making:      Comorbidities that affect care:    Hypertension    External Notes reviewed:          The following orders were placed and all results were independently analyzed by me:  Orders Placed This Encounter   Procedures    CT Head Without Contrast    XR Spine Lumbar 2 or 3 View    " Comprehensive Metabolic Panel    Magnesium    Urinalysis With Microscopic If Indicated (No Culture) - Urine, Clean Catch    CK    CBC Auto Differential    High Sensitivity Troponin T    High Sensitivity Troponin T 1Hr    Code Status and Medical Interventions: CPR (Attempt to Resuscitate); Full Support    Inpatient Hospitalist Consult    ECG 12 Lead Other; diffuse weakness    Inpatient Admission    CBC & Differential       Medications Given in the Emergency Department:  Medications   sodium chloride 0.9 % bolus 1,000 mL (1,000 mL Intravenous New Bag 5/10/25 2115)        ED Course:         Labs:    Lab Results (last 24 hours)       Procedure Component Value Units Date/Time    Comprehensive Metabolic Panel [[id number]]  (Abnormal) Collected: 05/10/25 1939    Specimen: Blood Updated: 05/10/25 2010     Glucose 102 mg/dL      BUN 49 mg/dL      Creatinine 1.36 mg/dL      Sodium 126 mmol/L      Potassium 5.3 mmol/L      Comment: Slight hemolysis detected by analyzer. Result may be falsely elevated.        Chloride 88 mmol/L      CO2 22.1 mmol/L      Calcium 10.2 mg/dL      Total Protein 7.5 g/dL      Albumin 3.2 g/dL      ALT (SGPT) 20 U/L      AST (SGOT) 24 U/L      Comment: Slight hemolysis detected by analyzer. Result may be falsely elevated.        Alkaline Phosphatase 93 U/L      Total Bilirubin 0.8 mg/dL      Globulin 4.3 gm/dL      A/G Ratio 0.7 g/dL      BUN/Creatinine Ratio 36.0     Anion Gap 15.9 mmol/L      eGFR 40.0 mL/min/1.73     Narrative:      GFR Categories in Chronic Kidney Disease (CKD)              GFR Category          GFR (mL/min/1.73)    Interpretation  G1                    90 or greater        Normal or high (1)  G2                    60-89                Mild decrease (1)  G3a                   45-59                Mild to moderate decrease  G3b                   30-44                Moderate to severe decrease  G4                    15-29                Severe decrease  G5                    14 or  less           Kidney failure    (1)In the absence of evidence of kidney disease, neither GFR category G1 or G2 fulfill the criteria for CKD.    eGFR calculation 2021 CKD-EPI creatinine equation, which does not include race as a factor    CBC & Differential [[id number]]  (Abnormal) Collected: 05/10/25 1939    Specimen: Blood Updated: 05/10/25 1951    Narrative:      The following orders were created for panel order CBC & Differential.  Procedure                               Abnormality         Status                     ---------                               -----------         ------                     CBC Auto Differential[[id number]]        Abnormal            Final result                 Please view results for these tests on the individual orders.    Magnesium [[id number]]  (Normal) Collected: 05/10/25 1939    Specimen: Blood Updated: 05/10/25 2010     Magnesium 2.0 mg/dL     CK [[id number]]  (Normal) Collected: 05/10/25 1939    Specimen: Blood Updated: 05/10/25 2010     Creatine Kinase 75 U/L     CBC Auto Differential [[id number]]  (Abnormal) Collected: 05/10/25 1939    Specimen: Blood Updated: 05/10/25 1951     WBC 11.84 10*3/mm3      RBC 4.03 10*6/mm3      Hemoglobin 12.7 g/dL      Hematocrit 37.6 %      MCV 93.3 fL      MCH 31.5 pg      MCHC 33.8 g/dL      RDW 12.0 %      RDW-SD 41.4 fl      MPV 8.7 fL      Platelets 256 10*3/mm3      Neutrophil % 74.7 %      Lymphocyte % 6.5 %      Monocyte % 11.6 %      Eosinophil % 4.8 %      Basophil % 0.5 %      Immature Grans % 1.9 %      Neutrophils, Absolute 8.85 10*3/mm3      Lymphocytes, Absolute 0.77 10*3/mm3      Monocytes, Absolute 1.37 10*3/mm3      Eosinophils, Absolute 0.57 10*3/mm3      Basophils, Absolute 0.06 10*3/mm3      Immature Grans, Absolute 0.22 10*3/mm3      nRBC 0.0 /100 WBC     High Sensitivity Troponin T [[id number]]  (Normal) Collected: 05/10/25 1939    Specimen: Blood Updated: 05/10/25 2010     HS Troponin T 12 ng/L     Narrative:      High  Sensitive Troponin T Reference Range:  <14.0 ng/L- Negative Female for AMI  <22.0 ng/L- Negative Male for AMI  >=14 - Abnormal Female indicating possible myocardial injury.  >=22 - Abnormal Male indicating possible myocardial injury.   Clinicians would have to utilize clinical acumen, EKG, Troponin, and serial changes to determine if it is an Acute Myocardial Infarction or myocardial injury due to an underlying chronic condition.         High Sensitivity Troponin T 1Hr [[id number]] Collected: 05/10/25 2135    Specimen: Blood Updated: 05/10/25 2137             Imaging:    CT Head Without Contrast  Result Date: 5/10/2025  CT HEAD WO CONTRAST HISTORY: Headache and weakness. TECHNIQUE: Axial unenhanced head CT with multiplanar reformats. Radiation dose reduction techniques included automated exposure control or exposure modulation based on body size. COMPARISON: None. FINDINGS: Ventricular size and configuration are normal. No acute infarct or hemorrhage is identified. There are no masses. There is no skull fracture. There is mild age-appropriate atrophy. Chronic small vessel ischemic changes are present in the white matter. Atherosclerotic calcifications are noted in the vertebral arteries and carotid siphons. 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     Senescent changes without acute abnormality. Electronically Signed: Laron Cruz MD  5/10/2025 9:20 PM EDT  Workstation ID: [id number]    XR Spine Lumbar 2 or 3 View  Result Date: 5/10/2025  XR SPINE LUMBAR 2 OR 3 VW Date of Exam: 5/10/2025 8:02 PM EDT Indication: pain Comparison: None available. Findings: Cholecystectomy clips are present. There is grade 1 anterolisthesis of L4 on L5 and L5 on S1. Alignment is otherwise normal. There is mild endplate spurring at multiple levels. There is mild disc space narrowing at L5-S1 and at L1-2 and L2-3. There is multilevel facet arthropathy, particularly in the lower half of the lumbar spine. No acute fractures. No bone erosion or  destruction. Note is also made of atherosclerotic disease.     Multilevel degenerative disease and spondylolisthesis as above. No acute findings. Electronically Signed: Laron Cruz MD  5/10/2025 8:17 PM EDT  Workstation ID: [id number]        Differential Diagnosis and Discussion:    Myalgia: Differential diagnosis includes but is not limited to muscle overuse or strain, infections, inflammatory conditions, autoimmune diseases, medications, metabolic disorders, fibromyalgia, vascular disorders, neurological conditions, psychological factors, toxins, and muscle disorders.    PROCEDURES:    Labs were collected in the emergency department and all labs were reviewed and interpreted by me.  X-ray were performed in the emergency department and all X-ray impressions were independently interpreted by me.  An EKG was performed and the EKG was interpreted by supervising attending.  CT scan was performed in the emergency department and the CT scan radiology impression was interpreted by me.    ECG 12 Lead Other; diffuse weakness   Final Result   HEART RATE=81  bpm   RR Mqgpnple=115  ms   AK Qkhztivy=376  ms   P Horizontal Axis=171  deg   P Front Axis=Invalid  deg   QRSD Interval=97  ms   QT Klctjtyi=689  ms   FPnM=797  ms   QRS Axis=-18  deg   T Wave Axis=3  deg   - ABNORMAL ECG -   Sinus rhythm   Nonspecific  T abnormalities, inferior leads   When compared with ECG of 01-Jul-2017 06:50:09,   Significant repolarization change   Significant axis, voltage or hypertrophy change   Electronically Signed By: Kev Vogel ([facility]) 2025-05-10 20:14:35   Date and Time of Study:2025-05-10 19:45:50          Procedures    MDM     Amount and/or Complexity of Data Reviewed  Clinical lab tests: reviewed  Tests in the radiology section of CPT®: reviewed  Tests in the medicine section of CPT®: reviewed  Decide to obtain previous medical records or to obtain history from someone other than the patient: yes         Patient has hyponatremia and an  DEVORAH.  I started with a liter of fluids and spoke with hospitalist Dr. Lopez who agrees to admit patient.              Patient Care Considerations:          Consultants/Shared Management Plan:    spoke with hospitalist Dr. Lopez who agrees to admit patient.    Social Determinants of Health:          Disposition and Care Coordination:    Admit:   Through independent evaluation of the patient's history, physical, and imperical data, the patient meets criteria for inpatient admission to the hospital.        Final diagnoses:   Hyponatremia   DEVORAH (acute kidney injury)        ED Disposition       ED Disposition   Decision to Admit    Condition   --    Comment   Level of Care: Med/Surg [1]   Diagnosis: Hyponatremia [601259]   Admitting Physician: GALLO LOPEZ [[id number]]   Attending Physician: GALLO LOPEZ [[id number]]   Certification: I Certify That Inpatient Hospital Services Are Medically Necessary For Greater Than 2 Midnights                 This medical record created using voice recognition software.             Robert Moreland PA-C  05/10/25 [id number]

## 2025-05-11 NOTE — PLAN OF CARE
Goal Outcome Evaluation:  Plan of Care Reviewed With: patient        Progress: no change  Outcome Evaluation: Admitted to unit due to hyponatremia and weakness in lower extremeties, A & O x 4, C/O pain in lower legs with cramping, pain medication given as ordered PRN. IV fluids NS infusing at 100 mL/hr. per order. One-assist to BSC, fall precautions in place, and education on prevention, call button within reach. Appears to be sleeping without discomfort being noted during this time.

## 2025-05-11 NOTE — PROGRESS NOTES
[facility]     Progress Note    Patient Name: Bettye France  : 1945  MRN: [id number]  Primary Care Physician:  Fredy Lopez MD  Date of admission: 5/10/2025      Subjective   Brief summary.  Patient admitted with weakness hyponatremia and dehydration      HPI:  Feeling slightly better.  Cramps and twitching better.  Continues to have back pain, restlessness decreased sleep and weakness       Review of Systems      Fatigue and weakness, no chest pain no shortness of breath.  Back pain  No fever no urinary symptoms      Objective     Vitals:   Temp:  [97.8 °F (36.6 °C)-98.2 °F (36.8 °C)] 97.9 °F (36.6 °C)  Heart Rate:  [73-84] 73  Resp:  [16-17] 16  BP: ()/(51-65) 98/51    Physical Exam :     Elderly female not in acute distress.  No icterus.  Heart regular.  Lungs clear.  Abdomen soft and obese nontender.  Extremities no edema.  Neuro awake alert and oriented, generalized weakness and difficulty getting up and walking      Result Review:  I have personally reviewed the results from the time of this admission to 2025 11:26 EDT and agree with these findings:  [x]  Laboratory  []  Microbiology  []  Radiology  []  EKG/Telemetry   []  Cardiology/Vascular   []  Pathology  []  Old records  []  Other:  MRI pending         Assessment / Plan       Active Hospital Problems:  Active Hospital Problems    Diagnosis     **Hyponatremia     Dehydration     Hyperkalemia    Back pain    Plan:   Sodium slightly better.  Generalized weakness and muscle twitching and tremors improving  Restart essential home meds  Consult Dr. Caceres for tremors  MRI pending  PT OT consulted       VTE Prophylaxis:  Pharmacologic VTE prophylaxis orders are present.        CODE STATUS:   Code Status (Patient has no pulse and is not breathing): CPR (Attempt to Resuscitate)  Medical Interventions (Patient has pulse or is breathing): Full Support          Fredy Lopez MD 25 11:26 EDT

## 2025-05-11 NOTE — H&P
[facility]   HISTORY AND PHYSICAL    Patient Name: Bettye France  : 1945  MRN: [id number]  Primary Care Physician:  Fredy Lopez MD  Date of admission: 5/10/2025    Subjective   Subjective     Chief Complaint:   Weakness and muscle cramps      HPI:    Bettye France is a 79 y.o. female presented to ER with weakness and leg cramps.  Patient called me earlier few days ago with back pain and muscle cramps and was given Flexeril subsequently she was also started on antibiotics for UTI for her symptoms.  Patient says nothing else.  She is weak difficulty walking cramps in the legs.  Workup in the ER revealed hyponatremia and dehydration.  When I saw patient in room 32 patient complains of tremors also.  This all started almost 10 days ago.  Patient was seen in [facility] clinic for UTI and was given Levaquin.  Patient's  and son present        Review of Systems:    Fatigue and weakness.  Muscle cramps and leg cramps.  Chronic back pain.  No chest pain or shortness of breath.  No abdominal pain.  Denies any nausea vomiting  No urinary symptoms.      Personal History     Past Medical History:   Diagnosis Date    High blood pressure        Past Surgical History:   Procedure Laterality Date    APPENDECTOMY      CHOLECYSTECTOMY      COLONOSCOPY      Dr. Watters    HYSTERECTOMY         Family History: family history includes Cancer in her brother; Crohn's disease in her brother. Otherwise pertinent FHx was reviewed and not pertinent to current issue.    Social History:  reports that she has never smoked. She does not have any smokeless tobacco history on file.    Home Medications:     Not updated yet by ER or pharmacy tech    Allergies:  No Known Allergies    Objective   Objective     Vitals:   Temp:  [97.8 °F (36.6 °C)] 97.8 °F (36.6 °C)  Heart Rate:  [81] 81  Resp:  [17] 17  BP: (151)/(65) 151/65    Physical Exam    Elderly female obese not in acute distress.    Oral mucosa dry.  Heart regular.   Lungs clear.  Abdomen soft.  Bowel sounds present.  Extremity with trace of edema  No swelling or calf tenderness.  No warmth.  Minimal tremors in the upper extremities noted      I have personally reviewed the results from the time of this admission to 5/10/2025 21:47 EDT and agree with these findings:  [x]  Laboratory  []  Microbiology  [x]  Radiology  [x]  EKG/Telemetry   []  Cardiology/Vascular   []  Pathology  []  Old records  []  Other:    CBC:    WBC   Date Value Ref Range Status   05/10/2025 11.84 (H) 3.40 - 10.80 10*3/mm3 Final     RBC   Date Value Ref Range Status   05/10/2025 4.03 3.77 - 5.28 10*6/mm3 Final     Hemoglobin   Date Value Ref Range Status   05/10/2025 12.7 12.0 - 15.9 g/dL Final     Hematocrit   Date Value Ref Range Status   05/10/2025 37.6 34.0 - 46.6 % Final     MCV   Date Value Ref Range Status   05/10/2025 93.3 79.0 - 97.0 fL Final     MCH   Date Value Ref Range Status   05/10/2025 31.5 26.6 - 33.0 pg Final     MCHC   Date Value Ref Range Status   05/10/2025 33.8 31.5 - 35.7 g/dL Final     RDW   Date Value Ref Range Status   05/10/2025 12.0 (L) 12.3 - 15.4 % Final     RDW-SD   Date Value Ref Range Status   05/10/2025 41.4 37.0 - 54.0 fl Final     MPV   Date Value Ref Range Status   05/10/2025 8.7 6.0 - 12.0 fL Final     Platelets   Date Value Ref Range Status   05/10/2025 256 140 - 450 10*3/mm3 Final     Neutrophil %   Date Value Ref Range Status   05/10/2025 74.7 42.7 - 76.0 % Final     Lymphocyte %   Date Value Ref Range Status   05/10/2025 6.5 (L) 19.6 - 45.3 % Final     Monocyte %   Date Value Ref Range Status   05/10/2025 11.6 5.0 - 12.0 % Final     Eosinophil %   Date Value Ref Range Status   05/10/2025 4.8 0.3 - 6.2 % Final     Basophil %   Date Value Ref Range Status   05/10/2025 0.5 0.0 - 1.5 % Final     Immature Grans %   Date Value Ref Range Status   05/10/2025 1.9 (H) 0.0 - 0.5 % Final     Neutrophils, Absolute   Date Value Ref Range Status   05/10/2025 8.85 (H) 1.70 - 7.00  10*3/mm3 Final     Lymphocytes, Absolute   Date Value Ref Range Status   05/10/2025 0.77 0.70 - 3.10 10*3/mm3 Final     Monocytes, Absolute   Date Value Ref Range Status   05/10/2025 1.37 (H) 0.10 - 0.90 10*3/mm3 Final     Eosinophils, Absolute   Date Value Ref Range Status   05/10/2025 0.57 (H) 0.00 - 0.40 10*3/mm3 Final     Basophils, Absolute   Date Value Ref Range Status   05/10/2025 0.06 0.00 - 0.20 10*3/mm3 Final     Immature Grans, Absolute   Date Value Ref Range Status   05/10/2025 0.22 (H) 0.00 - 0.05 10*3/mm3 Final     nRBC   Date Value Ref Range Status   05/10/2025 0.0 0.0 - 0.2 /100 WBC Final        BMP:    Lab Results   Component Value Date    GLUCOSE 102 (H) 05/10/2025    BUN 49 (H) 05/10/2025    CREATININE 1.36 (H) 05/10/2025    BCR 36.0 (H) 05/10/2025    K 5.3 (H) 05/10/2025    CO2 22.1 05/10/2025    CALCIUM 10.2 05/10/2025    ALBUMIN 3.2 (L) 05/10/2025    AST 24 05/10/2025    ALT 20 05/10/2025        CT Head Without Contrast  Result Date: 5/10/2025  Senescent changes without acute abnormality. Electronically Signed: Laron Cruz MD  5/10/2025 9:20 PM EDT  Workstation ID: [id number]    XR Spine Lumbar 2 or 3 View  Result Date: 5/10/2025  Multilevel degenerative disease and spondylolisthesis as above. No acute findings. Electronically Signed: Laron Cruz MD  5/10/2025 8:17 PM EDT  Workstation ID: [id number]             Assessment & Plan   Assessment / Plan       Current Diagnosis:  Active Hospital Problems    Diagnosis     **Hyponatremia     Dehydration     Hyperkalemia      Plan:   Admit to hospital.  IV fluids and hydration.  Symptomatic treatment for muscle cramps and pain.  Monitor labs.  Further management will be based on clinical course      VTE Prophylaxis:  Pharmacologic VTE prophylaxis orders are signed & held.          GI Prophylaxis:       Pepcid    CODE STATUS:    Code Status (Patient has no pulse and is not breathing): CPR (Attempt to Resuscitate)  Medical Interventions (Patient has  pulse or is breathing): Full Support    Admission Status:  I believe this patient meets inpatient status.             I have dictated this note utilizing Dragon Dictation.             Please note that portions of this note were completed with a voice recognition program.             Part of this note may be an electronic transcription/translation of spoken language to printed text         using the Dragon Dictation System.       Electronically signed by Fredy Lopez MD, 05/10/25, 9:45 PM EDT.    Total time spent with in evaluation and management:

## 2025-05-11 NOTE — PLAN OF CARE
Goal Outcome Evaluation:  Plan of Care Reviewed With: patient        Progress: no change  Outcome Evaluation: Patient alert and oriented x4 on room air. Patient medicated for pain with PRN medication per MAR. NS infusing at 100 ml/hr. Patient is a x1 assist to the bsc, tolerates activity well. MRIs performed this shift. No new issues at this time.

## 2025-05-11 NOTE — CONSULTS
[facility]   Consult Note      Patient Name: Bettye Fracne  : 1945  MRN: [id number]  Primary Care Physician:  Fredy Lopez MD  Date of admission: 5/10/2025    Subjective   Subjective     This 79 years old woman was seen upon the request of Dr. Alfredo Lopez MD for evaluation.    Chief Complaint:   Weakness  Tremors    HPI:  She is a 31-year-old she Walter sometime on May 7, 2025 when she started having nondescript pain in the back of her neck and head which have improved but have not gone away completely.  At the same time, she started having low back pain radiating to both lower extremities associated with heaviness and weakness in both lower extremities, and tremors of both hands and fingers.  However, there is no numbness anywhere.  She was not incontinent of her urine.  No difficulties in both upper extremities.    Review of Systems  There is no difficulty seeing, speaking as well as swallowing.  No difficulty breathing.  No chest pains or abdominal pains.      Past Medical History:   Diagnosis Date    High blood pressure        Past Surgical History:   Procedure Laterality Date    APPENDECTOMY      CHOLECYSTECTOMY      COLONOSCOPY      Dr. Watters    HYSTERECTOMY         Family History: family history includes Cancer in her brother; Crohn's disease in her brother. Otherwise pertinent FHx was reviewed and not pertinent to current issue.    Social History:  reports that she has never smoked. She has never been exposed to tobacco smoke. She does not have any smokeless tobacco history on file. She reports that she does not drink alcohol and does not use drugs.    Psychosocial History: No known psychiatric disturbance.    Home Medications:  acetaminophen, amLODIPine, atenolol, calcium carbonate, cholecalciferol, cyclobenzaprine, hydroxychloroquine, lisinopril, multivitamin with minerals, and spironolactone      Allergies:  No Known Allergies    Vitals:   Temp:  [97.8 °F (36.6 °C)-98.3 °F (36.8  °C)] 98.3 °F (36.8 °C)  Heart Rate:  [73-84] 80  Resp:  [16-18] 18  BP: ()/(51-65) 141/55  Physical Exam   She was awake and alert was not in any form of distress she was well-developed and fairly nourished.    Her heart was regular heart rate was 80/min.  There were no murmurs.  The lungs were clear.    The carotid pulses were 1+ and equal.  There were no bruits on either side.    Neurological Examination:  Her responses were coherent and relevant.  She was aware of what was going on around her.  She has an insight to her condition.  She was able to understand and follow verbal commands.    The fundi showed that the disc margins were well-delineated the veins were full and there were not engorged.  There were no exudates or hemorrhages.    The pupils were 4 mm. They were round and equal reactive to light directly and consensually.  There was no extraocular muscle weakness.    No facial asymmetry.  No facial weakness.  Was able to hear normal conversational speech.    The strength of the sternomastoid and trapezius muscles was normal and symmetrical.  The uvula and the tongue were in the midline.    The strength in both upper  extremities was normal and equal.  She is weak in both lower extremities.  The hip flexors were 3/5, the knee extensors were 4/5 on the left and 4+5 on the right.  The ankle dorsiflexors and plantar flexors were 5/5.    She has a glove and stocking type of decree sensation to cold temperature from her fingers to the level of the elbows on both sides, and from her ankles to the level of the knees on both sides.  She felt a vibrating tuning fork for 15/30 seconds on each index fingers 5/30 seconds on the right big toe, 3/30 seconds on the left big toe.    The deep tendon reflexes were 1-2+ and equal.    Did finger-to-nose test fairly well equal on both sides.      Result Review:  I have personally reviewed the results from the time of this admission to 5/11/2025 14:32 EDT and agree with  these findings:  [x]  Laboratory  []  Microbiology  [x]  Radiology  []  EKG/Telemetry   []  Cardiology/Vascular   []  Pathology  []  Old records  []  Other:  Most notable findings include:   May 11, 2025.  I reviewed the digital images of the CAT scan of her brain was done on May 10, 2025.  This study showed no acute changes.  There is moderate cortical atrophy which is more marked in the frontal and temporal regions.  There is moderate subcortical and periventricular white matter changes consistent with old microvascular ischemia.    I reviewed the digital images of the MRI of the cervical spine that was done on May 11, 2025.  This study showed no acute changes.  There is multilevel disc ossified complex bulging worse at C6-C7 levels without any evidence of nerve root or spinal cord compression.  There were no abnormal signal within the spinal cord.    I reviewed the digital images of the MRI of the lumbosacral spine that was done on May 11, 2025.  This study showed no acute changes.  There were multilevel disc osteophyte complex bulging from T12 all the way down to S1 levels worse at T8 12 T11 and to some extent L4-L5 and L5-S1 levels.  There is no evidence of any spinal cord or nerve root compression.       New Results - Lab  Sorted by update time  Updated   Order   05/11/25 0501  Comprehensive Metabolic Panel  Collected: 05/11/25 0409  Final result  Specimen: Blood from Arm, Right    Glucose 93 mg/dL ALT (SGPT) 17 U/L   BUN 40 High  mg/dL AST (SGOT) 18 U/L   Creatinine 1.20 High  mg/dL Alkaline Phosphatase 84 U/L   Sodium 128 Low  mmol/L Total Bilirubin 0.6 mg/dL   Potassium 4.8 mmol/L Globulin 3.8 gm/dL   Chloride 93 Low  mmol/L A/G Ratio 0.7 g/dL   CO2 22.6 mmol/L BUN/Creatinine Ratio 33.3 High    Calcium 9.4 mg/dL Anion Gap 12.4 mmol/L   Total Protein 6.5 g/dL eGFR 46.4 Low  mL/min/1.73   Albumin 2.7 Low  g/dL            05/11/25 0435  CBC Auto Differential  Collected: 05/11/25 0409  Final result   Specimen: Blood from Arm, Right    WBC 10.54 10*3/mm3 Lymphocyte % 8.2 Low  %   RBC 3.77 10*6/mm3 Monocyte % 13.5 High  %   Hemoglobin 11.8 Low  g/dL Eosinophil % 0.2 Low  %   Hematocrit 35.7 % Basophil % 0.7 %   MCV 94.7 fL Immature Grans % 1.6 High  %   MCH 31.3 pg Neutrophils, Absolute 8.00 High  10*3/mm3   MCHC 33.1 g/dL Lymphocytes, Absolute 0.86 10*3/mm3   RDW 12.2 Low  % Monocytes, Absolute 1.42 High  10*3/mm3   RDW-SD 42.8 fl Eosinophils, Absolute 0.02 10*3/mm3   MPV 8.7 fL Basophils, Absolute 0.07 10*3/mm3   Platelets 258 10*3/mm3 Immature Grans, Absolute 0.17 High  10*3/mm3   Neutrophil % 75.8 % nRBC 0.0 /100 WBC          05/11/25 0003  Urinalysis, Microscopic Only - Urine, Clean Catch  Collected: 05/10/25 2348  Final result  Specimen: Urine, Clean Catch    RBC, UA 0-2 /HPF Squamous Epithelial Cells, UA 0-2 /HPF   WBC, UA 21-50 Abnormal  /HPF Hyaline Casts, UA 0-2 /LPF   Bacteria, UA None Seen /HPF Methodology Automated Microscopy          05/11/25 0001  Urinalysis With Microscopic If Indicated (No Culture) - Urine, Clean Catch  Collected: 05/10/25 2348  Final result  Specimen: Urine, Clean Catch    Color, UA Yellow Bilirubin, UA Negative   Appearance, UA Clear Blood, UA Small (1+) Abnormal    pH, UA <=5.0 Protein, UA Negative   Specific [place], UA 1.009 Leuk Esterase, UA Moderate (2+) Abnormal    Glucose, UA Negative Nitrite, UA Negative   Ketones, UA Negative Urobilinogen, UA 0.2 E.U./dL          05/10/25 2331  Lactic Acid, Plasma  Collected: 05/10/25 2303  Final result  Specimen: Blood from Arm, Right    Lactate 1.2 mmol/L            05/10/25 2315  Procalcitonin  Collected: 05/10/25 2135  Final result  Specimen: Blood    Procalcitonin 0.62 High  ng/mL            05/10/25 2314  TSH  Collected: 05/10/25 2135  Final result  Specimen: Blood    TSH 1.340 uIU/mL            05/10/25 2239  Urinalysis, Microscopic Only - Urine, Clean Catch  Collected: 05/10/25 2225  Final result  Specimen: Urine,  Clean Catch    RBC, UA 0-2 /HPF Squamous Epithelial Cells, UA 3-6 Abnormal  /HPF   WBC, UA Too Numerous to Count Abnormal  /HPF Hyaline Casts, UA 0-2 /LPF   Bacteria, UA None Seen /HPF Methodology Automated Microscopy          05/10/25 2236  Urinalysis With Microscopic If Indicated (No Culture) - Urine, Clean Catch  Collected: 05/10/25 2225  Final result  Specimen: Urine, Clean Catch    Color, UA Yellow Bilirubin, UA Negative   Appearance, UA Cloudy Abnormal  Blood, UA Small (1+) Abnormal    pH, UA <=5.0 Protein, UA Negative   Specific [place], UA 1.010 Leuk Esterase, UA Moderate (2+) Abnormal    Glucose, UA Negative Nitrite, UA Negative   Ketones, UA Negative Urobilinogen, UA 1.0 E.U./dL          05/10/25 2156  High Sensitivity Troponin T 1Hr  Collected: 05/10/25 2135  Final result  Specimen: Blood    HS Troponin T 11 ng/L Troponin T Numeric Delta -1 ng/L          05/10/25 2010  Comprehensive Metabolic Panel  Collected: 05/10/25 1939  Final result  Specimen: Blood    Glucose 102 High  mg/dL ALT (SGPT) 20 U/L   BUN 49 High  mg/dL AST (SGOT) 24 U/L    Creatinine 1.36 High  mg/dL Alkaline Phosphatase 93 U/L   Sodium 126 Low  mmol/L Total Bilirubin 0.8 mg/dL   Potassium 5.3 High  mmol/L  Globulin 4.3 gm/dL   Chloride 88 Low  mmol/L A/G Ratio 0.7 g/dL   CO2 22.1 mmol/L BUN/Creatinine Ratio 36.0 High    Calcium 10.2 mg/dL Anion Gap 15.9 High  mmol/L   Total Protein 7.5 g/dL eGFR 40.0 Low  mL/min/1.73   Albumin 3.2 Low  g/dL            05/10/25 2010  Magnesium  Collected: 05/10/25 1939  Final result  Specimen: Blood    Magnesium 2.0 mg/dL            05/10/25 2010  CK  Collected: 05/10/25 1939  Final result  Specimen: Blood    Creatine Kinase 75 U/L            05/10/25 2010  High Sensitivity Troponin T  Collected: 05/10/25 1939  Final result  Specimen: Blood    HS Troponin T 12 ng/L            05/10/25 1951  CBC & Differential  Collected: 05/10/25 1939  Final result  Specimen: Blood           05/10/25 1951   CBC Auto Differential  Collected: 05/10/25 1939  Final result  Specimen: Blood    WBC 11.84 High  10*3/mm3 Lymphocyte % 6.5 Low  %   RBC 4.03 10*6/mm3 Monocyte % 11.6 %   Hemoglobin 12.7 g/dL Eosinophil % 4.8 %   Hematocrit 37.6 % Basophil % 0.5 %   MCV 93.3 fL Immature Grans % 1.9 High  %   MCH 31.5 pg Neutrophils, Absolute 8.85 High  10*3/mm3   MCHC 33.8 g/dL Lymphocytes, Absolute 0.77 10*3/mm3   RDW 12.0 Low  % Monocytes, Absolute 1.37 High  10*3/mm3   RDW-SD 41.4 fl Eosinophils, Absolute 0.57 High  10*3/mm3   MPV 8.7 fL Basophils, Absolute 0.06 10*3/mm3   Platelets 256 10*3/mm3 Immature Grans, Absolute 0.22 High  10*3/mm3   Neutrophil % 74.7 % nRBC 0.0 /100 WBC            Impression:    Headaches  Weakness both lower extremities  Chronic inflammatory demyelinating polyneuropathy  Essential tremors  Hyponatremia  Renal failure  Cervical spondylosis  Lumbosacral spondylosis        Plan:   Will obtain an MRI of the brain and thoracic spine and blood work to complete the workup.  Will start her on IVIG today.    The above conditions and plan of treatment and workup were discussed with her.  She is agreeable with the plan.      Please note that portions of this note were completed with a voice recognition program.  Part of this note is an electric or electronic transcription/translation of spoken language to printed text using the dragon dictating system.    Electronically signed by Sue Caceres Jr., MD, 05/11/25, 2:32 PM EDT.

## 2025-05-12 ENCOUNTER — APPOINTMENT (OUTPATIENT)
Dept: INTERVENTIONAL RADIOLOGY/VASCULAR | Facility: HOSPITAL | Age: 80
DRG: 641 | End: 2025-05-12
Payer: MEDICARE

## 2025-05-12 LAB
APPEARANCE CSF: CLEAR
APPEARANCE CSF: CLEAR
APTT PPP: 36.6 SECONDS (ref 24.2–34.2)
CEA SERPL-MCNC: 2.27 NG/ML
COLOR CSF: COLORLESS
COLOR CSF: COLORLESS
GLUCOSE CSF-MCNC: 65 MG/DL (ref 40–70)
HOLD SPECIMEN: NORMAL
IGA1 MFR SER: 202 MG/DL (ref 70–400)
IGG1 SER-MCNC: 823 MG/DL (ref 700–1600)
IGM SERPL-MCNC: 77 MG/DL (ref 40–230)
INR PPP: 1.41 (ref 0.86–1.15)
NUC CELL # CSF MANUAL: 0 /MM3 (ref 0–5)
NUC CELL # CSF MANUAL: 1.11 /MM3 (ref 0–5)
PROT CSF-MCNC: 35.2 MG/DL (ref 15–45)
PROTHROMBIN TIME: 17.9 SECONDS (ref 11.8–14.9)
RBC # CSF MANUAL: 11.11 /MM3
RBC # CSF MANUAL: 6.67 /MM3
TUBE # CSF: 1
TUBE # CSF: 4
VIT B12 BLD-MCNC: 511 PG/ML (ref 211–946)
WHOLE BLOOD HOLD SPECIMEN: NORMAL
XANTHOCHROMIA FLD QL: NORMAL
XANTHOCHROMIA FLD QL: NORMAL

## 2025-05-12 PROCEDURE — 25810000003 SODIUM CHLORIDE 0.9 % SOLUTION: Performed by: INTERNAL MEDICINE

## 2025-05-12 PROCEDURE — 25010000002 HYDROMORPHONE 1 MG/ML SOLUTION: Performed by: INTERNAL MEDICINE

## 2025-05-12 PROCEDURE — 82784 ASSAY IGA/IGD/IGG/IGM EACH: CPT | Performed by: PSYCHIATRY & NEUROLOGY

## 2025-05-12 PROCEDURE — 25010000002 LIDOCAINE 2% SOLUTION: Performed by: INTERNAL MEDICINE

## 2025-05-12 PROCEDURE — 94799 UNLISTED PULMONARY SVC/PX: CPT

## 2025-05-12 PROCEDURE — 85610 PROTHROMBIN TIME: CPT | Performed by: INTERNAL MEDICINE

## 2025-05-12 PROCEDURE — 83916 OLIGOCLONAL BANDS: CPT | Performed by: PSYCHIATRY & NEUROLOGY

## 2025-05-12 PROCEDURE — 63710000001 DIPHENHYDRAMINE PER 50 MG: Performed by: PSYCHIATRY & NEUROLOGY

## 2025-05-12 PROCEDURE — 97165 OT EVAL LOW COMPLEX 30 MIN: CPT

## 2025-05-12 PROCEDURE — 82945 GLUCOSE OTHER FLUID: CPT | Performed by: PSYCHIATRY & NEUROLOGY

## 2025-05-12 PROCEDURE — 25010000002 IMMUNE GLOBULIN (HUMAN) 20 GM/200ML SOLUTION 200 ML VIAL: Performed by: PSYCHIATRY & NEUROLOGY

## 2025-05-12 PROCEDURE — 25010000002 IMMUNE GLOBULIN (HUMAN) 10 GM/100ML SOLUTION 100 ML VIAL: Performed by: PSYCHIATRY & NEUROLOGY

## 2025-05-12 PROCEDURE — 82040 ASSAY OF SERUM ALBUMIN: CPT | Performed by: PSYCHIATRY & NEUROLOGY

## 2025-05-12 PROCEDURE — 85730 THROMBOPLASTIN TIME PARTIAL: CPT | Performed by: INTERNAL MEDICINE

## 2025-05-12 PROCEDURE — 94761 N-INVAS EAR/PLS OXIMETRY MLT: CPT

## 2025-05-12 PROCEDURE — 83873 ASSAY OF CSF PROTEIN: CPT | Performed by: PSYCHIATRY & NEUROLOGY

## 2025-05-12 PROCEDURE — 82042 OTHER SOURCE ALBUMIN QUAN EA: CPT | Performed by: PSYCHIATRY & NEUROLOGY

## 2025-05-12 PROCEDURE — 97161 PT EVAL LOW COMPLEX 20 MIN: CPT

## 2025-05-12 PROCEDURE — 89050 BODY FLUID CELL COUNT: CPT | Performed by: PSYCHIATRY & NEUROLOGY

## 2025-05-12 PROCEDURE — 84157 ASSAY OF PROTEIN OTHER: CPT | Performed by: PSYCHIATRY & NEUROLOGY

## 2025-05-12 RX ORDER — LIDOCAINE HYDROCHLORIDE 20 MG/ML
20 INJECTION, SOLUTION INFILTRATION; PERINEURAL ONCE
Status: COMPLETED | OUTPATIENT
Start: 2025-05-12 | End: 2025-05-12

## 2025-05-12 RX ORDER — CARBAMAZEPINE 100 MG/1
100 TABLET, EXTENDED RELEASE ORAL EVERY 12 HOURS SCHEDULED
Status: DISCONTINUED | OUTPATIENT
Start: 2025-05-12 | End: 2025-05-16 | Stop reason: HOSPADM

## 2025-05-12 RX ORDER — INDOMETHACIN 25 MG/1
10 CAPSULE ORAL ONCE
Status: COMPLETED | OUTPATIENT
Start: 2025-05-12 | End: 2025-05-12

## 2025-05-12 RX ADMIN — SODIUM CHLORIDE 100 ML/HR: 9 INJECTION, SOLUTION INTRAVENOUS at 10:05

## 2025-05-12 RX ADMIN — SODIUM CHLORIDE 100 ML/HR: 9 INJECTION, SOLUTION INTRAVENOUS at 00:04

## 2025-05-12 RX ADMIN — IMMUNE GLOBULIN (HUMAN) 30 G: 10 INJECTION INTRAVENOUS; SUBCUTANEOUS at 18:31

## 2025-05-12 RX ADMIN — HYDROMORPHONE HYDROCHLORIDE 0.5 MG: 1 INJECTION, SOLUTION INTRAMUSCULAR; INTRAVENOUS; SUBCUTANEOUS at 13:04

## 2025-05-12 RX ADMIN — LIDOCAINE HYDROCHLORIDE 5 ML: 20 INJECTION, SOLUTION INFILTRATION; PERINEURAL at 11:40

## 2025-05-12 RX ADMIN — Medication 1 TABLET: at 17:28

## 2025-05-12 RX ADMIN — SODIUM BICARBONATE 1 ML: 84 INJECTION INTRAVENOUS at 11:40

## 2025-05-12 RX ADMIN — SODIUM CHLORIDE 100 ML/HR: 9 INJECTION, SOLUTION INTRAVENOUS at 22:18

## 2025-05-12 RX ADMIN — HYDROXYCHLOROQUINE SULFATE 200 MG: 200 TABLET ORAL at 09:11

## 2025-05-12 RX ADMIN — HYDROMORPHONE HYDROCHLORIDE 0.5 MG: 1 INJECTION, SOLUTION INTRAMUSCULAR; INTRAVENOUS; SUBCUTANEOUS at 18:29

## 2025-05-12 RX ADMIN — Medication 1 TABLET: at 09:11

## 2025-05-12 RX ADMIN — FAMOTIDINE 40 MG: 20 TABLET, FILM COATED ORAL at 09:11

## 2025-05-12 RX ADMIN — HYDROMORPHONE HYDROCHLORIDE 0.5 MG: 1 INJECTION, SOLUTION INTRAMUSCULAR; INTRAVENOUS; SUBCUTANEOUS at 09:11

## 2025-05-12 RX ADMIN — DIPHENHYDRAMINE HYDROCHLORIDE 25 MG: 25 CAPSULE ORAL at 17:28

## 2025-05-12 RX ADMIN — ATENOLOL 25 MG: 25 TABLET ORAL at 20:22

## 2025-05-12 RX ADMIN — Medication 10 ML: at 09:12

## 2025-05-12 RX ADMIN — Medication 10 ML: at 20:24

## 2025-05-12 RX ADMIN — HYDROMORPHONE HYDROCHLORIDE 0.5 MG: 1 INJECTION, SOLUTION INTRAMUSCULAR; INTRAVENOUS; SUBCUTANEOUS at 00:36

## 2025-05-12 RX ADMIN — HYDROCODONE BITARTRATE AND ACETAMINOPHEN 1 TABLET: 5; 325 TABLET ORAL at 20:22

## 2025-05-12 RX ADMIN — CARBAMAZEPINE 100 MG: 100 TABLET, EXTENDED RELEASE ORAL at 20:22

## 2025-05-12 RX ADMIN — ACETAMINOPHEN 650 MG: 325 TABLET ORAL at 17:28

## 2025-05-12 NOTE — PLAN OF CARE
Goal Outcome Evaluation:  Plan of Care Reviewed With: patient        Progress: no change  Outcome Evaluation: Patient demonstrates balance, strength and endurance limitations that impact her ability to perform adls. The skills of a therapist are necessary in order to implement the following treatment plan.    Anticipated Discharge Disposition (OT): inpatient rehabilitation facility

## 2025-05-12 NOTE — THERAPY EVALUATION
Acute Care - Physical Therapy Initial Evaluation   J[facility]     Patient Name: Bettye France  : 1945  MRN: [id number]  Today's Date: 2025      Visit Dx:     ICD-10-CM ICD-9-CM   1. Hyponatremia  E87.1 276.1   2. DEVORAH (acute kidney injury)  N17.9 584.9   3. Impaired mobility and ADLs  Z74.09 V49.89    Z78.9    4. Difficulty walking  R26.2 719.7     Patient Active Problem List   Diagnosis    Hyponatremia    Dehydration    Hyperkalemia     Past Medical History:   Diagnosis Date    High blood pressure      Past Surgical History:   Procedure Laterality Date    APPENDECTOMY      CHOLECYSTECTOMY      COLONOSCOPY      Dr. Watters    HYSTERECTOMY       PT Assessment (Last 12 Hours)       PT Evaluation and Treatment       Row Name 25 1500          Physical Therapy Time and Intention    Document Type evaluation  -AV     Mode of Treatment individual therapy;physical therapy  -AV       Row Name 25 1500          General Information    Patient Profile Reviewed yes  -AV     Patient Observations alert;cooperative;agree to therapy  -AV     Prior Level of Function --  Typically (I) with ADLs, increasing weakness in the past week causing difficulty. Ambulated without an assistive device but was using RW in the few days leading up to admission. No home O2. No recent falls.  -AV     Equipment Currently Used at Home walker, rolling  Just in the few days leading up to admission. Was not using a device prior  -AV     Existing Precautions/Restrictions fall  -AV       Row Name 25 1500          Living Environment    Current Living Arrangements home  -AV     Home Accessibility stairs to enter home;stairs within home  -AV     People in Home spouse  -AV       Row Name 25 1500          Home Main Entrance    Number of Stairs, Main Entrance two  -AV       Row Name 25 1500          Stairs Within Home, Primary    Stairs, Within Home, Primary Basement laundry  -AV       Row Name 25 1500           Cognition    Orientation Status (Cognition) oriented x 3  -AV       Row Name 05/12/25 1500          Range of Motion (ROM)    Range of Motion bilateral lower extremities;ROM is WFL  -AV       Row Name 05/12/25 1500          Bed Mobility    Bed Mobility supine-sit;sit-supine  -AV     All Activities, [place] (Bed Mobility) contact guard  -AV     Supine-Sit [place] (Bed Mobility) contact guard  -AV       Row Name 05/12/25 1500          Transfers    Transfers sit-stand transfer;stand-sit transfer;toilet transfer  -AV       Row Name 05/12/25 1500          Sit-Stand Transfer    Sit-Stand [place] (Transfers) contact guard  -AV     Assistive Device (Sit-Stand Transfers) walker, front-wheeled  -AV       Row Name 05/12/25 1500          Stand-Sit Transfer    Stand-Sit [place] (Transfers) contact guard  -AV     Assistive Device (Stand-Sit Transfers) walker, front-wheeled  -AV       Row Name 05/12/25 1500          Toilet Transfer    Type (Toilet Transfer) sit-stand;stand-sit  -AV     [place] Level (Toilet Transfer) contact guard  -AV     Assistive Device (Toilet Transfer) commode, 3-in-1  -AV       Row Name 05/12/25 1500          Gait/Stairs (Locomotion)    Gait/Stairs Locomotion gait/ambulation independence;gait/ambulation assistive device;distance ambulated  -AV     [place] Level (Gait) contact guard  -AV     Assistive Device (Gait) walker, front-wheeled  -AV     Distance in Feet (Gait) 8  4' to/from C  -AV       Row Name 05/12/25 1500          Safety Issues/Impairments Affecting Functional Mobility    Impairments Affecting Function (Mobility) balance;endurance/activity tolerance;strength  -AV       Row Name 05/12/25 1500          Balance    Balance Assessment standing dynamic balance  -AV     Dynamic Standing Balance contact guard  -AV     Position/Device Used, Standing Balance supported;walker, front-wheeled  -AV       Row Name 05/12/25 1500          Plan of Care Review    Plan of Care Reviewed  With patient  -AV     Progress no change  -AV     Outcome Evaluation Patient presents with deficits in balance, endurance, transfers, and ambulation. Patient will benefit from skilled PT services to address these mobility deficits and decrease risk of falls.  -AV       Row Name 05/12/25 1500          Positioning and Restraints    Pre-Treatment Position in bed  -AV     Post Treatment Position bed  -AV     In Bed fowlers;call light within reach;encouraged to call for assist;exit alarm on  -AV       Row Name 05/12/25 1500          Therapy Assessment/Plan (PT)    Rehab Potential (PT) good  -AV     Criteria for Skilled Interventions Met (PT) yes;meets criteria  -AV     Therapy Frequency (PT) daily  -AV     Predicted Duration of Therapy Intervention (PT) 10 days  -AV     Problem List (PT) problems related to;balance;mobility;pain  -AV     Activity Limitations Related to Problem List (PT) unable to transfer safely;unable to ambulate safely  -AV       Row Name 05/12/25 1500          PT Evaluation Complexity    History, PT Evaluation Complexity no personal factors and/or comorbidities  -AV     Examination of Body Systems (PT Eval Complexity) total of 4 or more elements  -AV     Clinical Presentation (PT Evaluation Complexity) stable  -AV     Clinical Decision Making (PT Evaluation Complexity) low complexity  -AV     Overall Complexity (PT Evaluation Complexity) low complexity  -AV       Row Name 05/12/25 1500          Therapy Plan Review/Discharge Plan (PT)    Therapy Plan Review (PT) evaluation/treatment results reviewed;patient  -AV       Row Name 05/12/25 1500          Physical Therapy Goals    Bed Mobility Goal Selection (PT) bed mobility, PT goal 1  -AV     Transfer Goal Selection (PT) transfer, PT goal 1  -AV     Gait Training Goal Selection (PT) gait training, PT goal 1  -AV       Row Name 05/12/25 1500          Bed Mobility Goal 1 (PT)    Activity/Assistive Device (Bed Mobility Goal 1, PT) sit to supine/supine to sit   -AV     [place] Level/Cues Needed (Bed Mobility Goal 1, PT) modified independence  -AV     Time Frame (Bed Mobility Goal 1, PT) 10 days  -AV       Row Name 05/12/25 1500          Transfer Goal 1 (PT)    Activity/Assistive Device (Transfer Goal 1, PT) sit-to-stand/stand-to-sit;bed-to-chair/chair-to-bed;walker, rolling  -AV     [place] Level/Cues Needed (Transfer Goal 1, PT) modified independence  -AV     Time Frame (Transfer Goal 1, PT) 10 days  -AV       Row Name 05/12/25 1500          Gait Training Goal 1 (PT)    Activity/Assistive Device (Gait Training Goal 1, PT) gait (walking locomotion);assistive device use;walker, rolling  -AV     [place] Level (Gait Training Goal 1, PT) modified independence  -AV     Distance (Gait Training Goal 1, PT) 200  -AV     Time Frame (Gait Training Goal 1, PT) 10 days  -AV               User Key  (r) = Recorded By, (t) = Taken By, (c) = Cosigned By      Initials Name Provider Type    AV King Bynum, TANESHA Physical Therapist                    Physical Therapy Education       Title: PT OT SLP Therapies (In Progress)       Topic: Physical Therapy (In Progress)       Point: Mobility training (Done)       Learning Progress Summary            Patient Acceptance, E,TB, VU by AV at 5/12/2025 1552                      Point: Home exercise program (Not Started)       Learner Progress:  Not documented in this visit.              Point: Body mechanics (Done)       Learning Progress Summary            Patient Acceptance, E,TB, VU by AV at 5/12/2025 1552                      Point: Precautions (Done)       Learning Progress Summary            Patient Acceptance, E,TB, VU by AV at 5/12/2025 1552                                      User Key       Initials Effective Dates Name Provider Type Discipline    AV 06/11/21 -  King Bynum, PT Physical Therapist PT                  PT Recommendation and Plan  Anticipated Discharge Disposition (PT): inpatient rehabilitation  facility  Planned Therapy Interventions (PT): balance training, bed mobility training, gait training, home exercise program, neuromuscular re-education, strengthening, transfer training  Therapy Frequency (PT): daily  Plan of Care Reviewed With: patient  Progress: no change  Outcome Evaluation: Patient presents with deficits in balance, endurance, transfers, and ambulation. Patient will benefit from skilled PT services to address these mobility deficits and decrease risk of falls.   Outcome Measures       Row Name 05/12/25 1500             How much help from another person do you currently need...    Turning from your back to your side while in flat bed without using bedrails? 4  -AV      Moving from lying on back to sitting on the side of a flat bed without bedrails? 3  -AV      Moving to and from a bed to a chair (including a wheelchair)? 3  -AV      Standing up from a chair using your arms (e.g., wheelchair, bedside chair)? 3  -AV      Climbing 3-5 steps with a railing? 2  -AV      To walk in hospital room? 3  -AV      AM-PAC 6 Clicks Score (PT) 18  -AV         Functional Assessment    Outcome Measure Options AM-PAC 6 Clicks Basic Mobility (PT)  -AV                User Key  (r) = Recorded By, (t) = Taken By, (c) = Cosigned By      Initials Name Provider Type    AV King Bynum, PT Physical Therapist                     Time Calculation:    PT Charges       Row Name 05/12/25 1551             Time Calculation    PT Received On 05/12/25  -AV      PT Goal Re-Cert Due Date 05/21/25  -AV         Untimed Charges    PT Eval/Re-eval Minutes 35  -AV         Total Minutes    Untimed Charges Total Minutes 35  -AV       Total Minutes 35  -AV                User Key  (r) = Recorded By, (t) = Taken By, (c) = Cosigned By      Initials Name Provider Type    King Nichols, PT Physical Therapist                  Therapy Charges for Today       Code Description Service Date Service Provider Modifiers Qty    [id number]  HC PT EVAL LOW COMPLEXITY 3 5/12/2025 King Bynum, PT GP 1            PT G-Codes  Outcome Measure Options: AM-PAC 6 Clicks Basic Mobility (PT)  AM-PAC 6 Clicks Score (PT): 18  AM-PAC 6 Clicks Score (OT): 21    King Bynum, PT  5/12/2025

## 2025-05-12 NOTE — THERAPY EVALUATION
Patient Name: Bettye France  : 1945    MRN: [id number]                              Today's Date: 2025       Admit Date: 5/10/2025    Visit Dx:     ICD-10-CM ICD-9-CM   1. Hyponatremia  E87.1 276.1   2. DEVORAH (acute kidney injury)  N17.9 584.9   3. Impaired mobility and ADLs  Z74.09 V49.89    Z78.9      Patient Active Problem List   Diagnosis    Hyponatremia    Dehydration    Hyperkalemia     Past Medical History:   Diagnosis Date    High blood pressure      Past Surgical History:   Procedure Laterality Date    APPENDECTOMY      CHOLECYSTECTOMY      COLONOSCOPY      Dr. Watters    HYSTERECTOMY        General Information       Row Name 25 1219          OT Time and Intention    Document Type evaluation  -AC     Mode of Treatment individual therapy;occupational therapy  -AC     Patient Effort good  -       Row Name 25 1219          General Information    Patient Profile Reviewed yes  -AC     Prior Level of Function --  Patient reports (I) with adls and functional mobility until just a few days before admission she started using a rolling walker. She has a tub/shower for bathing and regular toilet in place.  -     Existing Precautions/Restrictions fall  -     Barriers to Rehab none identified  -       Row Name 25 1219          Occupational Profile    Reason for Services/Referral (Occupational Profile) patient is a 79 year old female admitted for the above diagnosis. Occupational therapy consulted to determine safety and independence with adls. Patient not currently receiving OT services for the above diagnosis.  -       Row Name 25 1219          Living Environment    Current Living Arrangements home  -AC     People in Home spouse  -       Row Name 25 1219          Home Main Entrance    Number of Stairs, Main Entrance two  -       Row Name 25 1219          Stairs Within Home, Primary    Stairs Comment, Within Home, Primary flight to basement where the  laundry is.  -       Row Name 05/12/25 1219          Cognition    Orientation Status (Cognition) oriented x 3  -       Row Name 05/12/25 1219          Safety Issues/Impairments Affecting Functional Mobility    Impairments Affecting Function (Mobility) balance;endurance/activity tolerance;strength;pain  -               User Key  (r) = Recorded By, (t) = Taken By, (c) = Cosigned By      Initials Name Provider Type     Katlyn Peralta OT Occupational Therapist                     Mobility/ADL's       Row Name 05/12/25 1225          Bed Mobility    Bed Mobility bed mobility (all) activities  -     All Activities, [place] (Bed Mobility) contact guard;1 person assist  -     Assistive Device (Bed Mobility) head of bed elevated;bed rails  -[facility] Name 05/12/25 1225          Transfers    Transfers sit-stand transfer;toilet transfer  -[facility] Name 05/12/25 1225          Sit-Stand Transfer    Sit-Stand [place] (Transfers) contact guard;1 person assist;verbal cues  -     Assistive Device (Sit-Stand Transfers) walker, front-wheeled  -[facility] Name 05/12/25 1225          Toilet Transfer    Type (Toilet Transfer) stand pivot/stand step  -     [place] Level (Toilet Transfer) contact guard;1 person assist;verbal cues  -     Assistive Device (Toilet Transfer) commode, 3-in-1  -[facility] Name 05/12/25 1225          Functional Mobility    Functional Mobility- Ind. Level contact guard assist;1 person;verbal cues required  -     Functional Mobility- Device walker, front-wheeled  -     Functional Mobility- Comment patient was CGA with rolling walker for BSC to stretcher in hallway as patient was leaving with transport. Patient cued for walker technique and safety.  -       Row Name 05/12/25 1225          Activities of Daily Living    BADL Assessment/Intervention --  Patient is setup for upper body bathing/dressing, setup for grooming, setup for self-feeding, min A for lower body  bathing/dressing, CGA for toileting.  -               User Key  (r) = Recorded By, (t) = Taken By, (c) = Cosigned By      Initials Name Provider Type    Katlyn Su OT Occupational Therapist                   Obj/Interventions       [facility] Name 05/12/25 1227          Sensory Assessment (Somatosensory)    Sensory Assessment (Somatosensory) UE sensation intact  -AC       Row Name 05/12/25 1227          Vision Assessment/Intervention    Visual Impairment/Limitations WFL  -[facility] Name 05/12/25 1227          Range of Motion Comprehensive    General Range of Motion bilateral upper extremity ROM WNL  -[facility] Name 05/12/25 1227          Strength Comprehensive (MMT)    General Manual Muscle Testing (MMT) Assessment no strength deficits identified  -[facility] Name 05/12/25 1227          Motor Skills    Motor Skills coordination;functional endurance  -     Coordination WFL  -     Functional Endurance f-  -AC       Row Name 05/12/25 1227          Balance    Balance Assessment standing dynamic balance  -     Dynamic Standing Balance contact guard  -     Position/Device Used, Standing Balance supported;walker, front-wheeled  -     Balance Interventions standing;sit to stand;supported;dynamic;minimal challenge;occupation based/functional task  -               User Key  (r) = Recorded By, (t) = Taken By, (c) = Cosigned By      Initials Name Provider Type    Katlyn Su OT Occupational Therapist                   Goals/Plan       Row Name 05/12/25 1237          Bed Mobility Goal 1 (OT)    Activity/Assistive Device (Bed Mobility Goal 1, OT) bed mobility activities, all  -AC     Start Level/Cues Needed (Bed Mobility Goal 1, OT) modified independence  -     Time Frame (Bed Mobility Goal 1, OT) long term goal (LTG);10 days  -AC       Row Name 05/12/25 1237          Transfer Goal 1 (OT)    Activity/Assistive Device (Transfer Goal 1, OT) transfers, all  -AC     Start Level/Cues Needed  (Transfer Goal 1, OT) modified independence  -AC     Time Frame (Transfer Goal 1, OT) long term goal (LTG);10 days  -AC       Row Name 05/12/25 1237          Bathing Goal 1 (OT)    Activity/Device (Bathing Goal 1, OT) bathing skills, all  -AC     [place] Level/Cues Needed (Bathing Goal 1, OT) modified independence  -AC     Time Frame (Bathing Goal 1, OT) long term goal (LTG);10 days  -AC       Row Name 05/12/25 1237          Dressing Goal 1 (OT)    Activity/Device (Dressing Goal 1, OT) dressing skills, all  -AC     [place]/Cues Needed (Dressing Goal 1, OT) modified independence  -AC     Time Frame (Dressing Goal 1, OT) long term goal (LTG);10 days  -AC       Row Name 05/12/25 1237          Toileting Goal 1 (OT)    Activity/Device (Toileting Goal 1, OT) toileting skills, all  -AC     [place] Level/Cues Needed (Toileting Goal 1, OT) modified independence  -AC     Time Frame (Toileting Goal 1, OT) long term goal (LTG);10 days  -       Row Name 05/12/25 1237          Strength Goal 1 (OT)    Strength Goal 1 (OT) Patient will demonstrate BUE strength of 5/5 for adls.  -AC     Time Frame (Strength Goal 1, OT) long term goal (LTG);10 days  -       Row Name 05/12/25 1237          Problem Specific Goal 1 (OT)    Problem Specific Goal 1 (OT) Patient will demonstrate good activity tolerance for adls.  -AC     Time Frame (Problem Specific Goal 1, OT) long term goal (LTG);10 days  -       Row Name 05/12/25 1237          Therapy Assessment/Plan (OT)    Planned Therapy Interventions (OT) activity tolerance training;functional balance retraining;occupation/activity based interventions;patient/caregiver education/training;transfer/mobility retraining;ROM/therapeutic exercise;BADL retraining  -               User Key  (r) = Recorded By, (t) = Taken By, (c) = Cosigned By      Initials Name Provider Type    Katlyn Su OT Occupational Therapist                   Clinical Impression       Row Name 05/12/25  1235          Pain Assessment    Pretreatment Pain Rating 0/10 - no pain  -AC     Posttreatment Pain Rating 0/10 - no pain  -AC       Row Name 05/12/25 1235          Plan of Care Review    Plan of Care Reviewed With patient  -     Progress no change  -     Outcome Evaluation Patient demonstrates balance, strength and endurance limitations that impact her ability to perform adls. The skills of a therapist are necessary in order to implement the following treatment plan.  -       Row Name 05/12/25 1235          Therapy Assessment/Plan (OT)    Patient/Family Therapy Goal Statement (OT) none stated  -     Rehab Potential (OT) good  -AC     Criteria for Skilled Therapeutic Interventions Met (OT) yes;meets criteria;skilled treatment is necessary  -     Therapy Frequency (OT) 5 times/wk  -       Row Name 05/12/25 1235          Therapy Plan Review/Discharge Plan (OT)    Equipment Needs Upon Discharge (OT) walker, rolling;commode chair;tub bench  -     Anticipated Discharge Disposition (OT) inpatient rehabilitation facility  -       Row Name 05/12/25 1233          Positioning and Restraints    Pre-Treatment Position in bed  -AC     Post Treatment Position other  -AC     Other Position with other staff  transport  -               User Key  (r) = Recorded By, (t) = Taken By, (c) = Cosigned By      Initials Name Provider Type    AC Katlyn Peralta, JA Occupational Therapist                   Outcome Measures       Row Name 05/12/25 1232          How much help from another is currently needed...    Putting on and taking off regular lower body clothing? 3  -AC     Bathing (including washing, rinsing, and drying) 3  -AC     Toileting (which includes using toilet bed pan or urinal) 3  -AC     Putting on and taking off regular upper body clothing 4  -AC     Taking care of personal grooming (such as brushing teeth) 4  -AC     Eating meals 4  -AC     AM-PAC 6 Clicks Score (OT) 21  -       Row Name 05/12/25 0991           How much help from another person do you currently need...    Turning from your back to your side while in flat bed without using bedrails? 4  -EW     Moving from lying on back to sitting on the side of a flat bed without bedrails? 4  -EW     Moving to and from a bed to a chair (including a wheelchair)? 3  -EW     Standing up from a chair using your arms (e.g., wheelchair, bedside chair)? 3  -EW     Climbing 3-5 steps with a railing? 3  -EW     To walk in hospital room? 3  -EW     AM-PAC 6 Clicks Score (PT) 20  -EW       Row Name 05/12/25 1238          Functional Assessment    Outcome Measure Options AM-PAC 6 Clicks Daily Activity (OT);Optimal Instrument  -AC       Row Name 05/12/25 1238          Optimal Instrument    Optimal Instrument Optimal - 3  -AC     Bending/Stooping 2  -AC     Standing 2  -AC     Reaching 1  -AC     From the list, choose the 3 activities you would most like to be able to do without any difficulty Bending/stooping;Standing;Reaching  -AC     Total Score Optimal - 3 5  -AC               User Key  (r) = Recorded By, (t) = Taken By, (c) = Cosigned By      Initials Name Provider Type    Katlyn Su OT Occupational Therapist    Charleen Dhaliwal, RN Registered Nurse                    Occupational Therapy Education       Title: PT OT SLP Therapies (Done)       Topic: Occupational Therapy (Done)       Point: ADL training (Done)       Learning Progress Summary            Patient Acceptance, E, VU by  at 5/12/2025 1239                      Point: Home exercise program (Done)       Learning Progress Summary            Patient Acceptance, E, VU by  at 5/12/2025 1239                      Point: Precautions (Done)       Learning Progress Summary            Patient Acceptance, E, VU by  at 5/12/2025 1239                      Point: Body mechanics (Done)       Learning Progress Summary            Patient Acceptance, E, VU by  at 5/12/2025 1239                                      User Key        Initials Effective Dates Name Provider Type Discipline     06/16/21 -  Katlyn Peralta OT Occupational Therapist OT                  OT Recommendation and Plan  Planned Therapy Interventions (OT): activity tolerance training, functional balance retraining, occupation/activity based interventions, patient/caregiver education/training, transfer/mobility retraining, ROM/therapeutic exercise, BADL retraining  Therapy Frequency (OT): 5 times/wk  Plan of Care Review  Plan of Care Reviewed With: patient  Progress: no change  Outcome Evaluation: Patient demonstrates balance, strength and endurance limitations that impact her ability to perform adls. The skills of a therapist are necessary in order to implement the following treatment plan.     Time Calculation:   Evaluation Complexity (OT)  Review Occupational Profile/Medical/Therapy History Complexity: expanded/moderate complexity  Assessment, Occupational Performance/Identification of Deficit Complexity: 1-3 performance deficits  Clinical Decision Making Complexity (OT): problem focused assessment/low complexity  Overall Complexity of Evaluation (OT): low complexity     Time Calculation- OT       Row Name 05/12/25 1240             Time Calculation- OT    OT Received On 05/12/25  -AC      OT Goal Re-Cert Due Date 05/21/25  -         Untimed Charges    OT Eval/Re-eval Minutes 32  -AC         Total Minutes    Untimed Charges Total Minutes 32  -AC       Total Minutes 32  -AC                User Key  (r) = Recorded By, (t) = Taken By, (c) = Cosigned By      Initials Name Provider Type    AC Katlyn Peralta OT Occupational Therapist                  Therapy Charges for Today       Code Description Service Date Service Provider Modifiers Qty    77427498108 HC OT EVAL LOW COMPLEXITY 3 5/12/2025 Katlyn Peralta OT GO 1                 Katlyn Peralta OT  5/12/2025

## 2025-05-12 NOTE — PLAN OF CARE
Goal Outcome Evaluation:  Plan of Care Reviewed With: patient        Progress: no change  Outcome Evaluation: Patient alert and oriented x4 on room air. Lumbar puncture performed this shift, dressings are clean, dry, and intact. Patient medicated for pain with PRN medications per MAR. IVIG administration started this shift. No new issues at this time.

## 2025-05-12 NOTE — PLAN OF CARE
Goal Outcome Evaluation:              Outcome Evaluation: PT is A&O x4. IVIG administered, tolerated by pt. Pt c/o of pain and pain meds given per MAR. Normal saline running continousky at 100ml/hr. X1 assist to BSC. Continue care plan.

## 2025-05-12 NOTE — PROGRESS NOTES
[facility]     Progress Note             Patient Name: Bettye France  : 1945  MRN: [id number]  Primary Care Physician:  Fredy Lopez MD  Date of admission: 5/10/2025        Present illness:  Her  and son were on the bedside.  She is doing better.  She is moving the lower extremities better.  So far, she has no reaction from the IVIG.    She complained of pain in both feet in spite of the Dilaudid and Norco that has been receiving.  We will try her on carbamazepine and see how she does.    Review of Systems  No difficulty seeing speaking as well as swallowing.      Past Medical History:   Diagnosis Date    High blood pressure        Past Surgical History:   Procedure Laterality Date    APPENDECTOMY      CHOLECYSTECTOMY      COLONOSCOPY      Dr. Watters    HYSTERECTOMY         Family History: family history includes Cancer in her brother; Crohn's disease in her brother. Otherwise pertinent FHx was reviewed and not pertinent to current issue.    Social History:  reports that she has never smoked. She has never been exposed to tobacco smoke. She does not have any smokeless tobacco history on file. She reports that she does not drink alcohol and does not use drugs.      Home Medications:  acetaminophen, amLODIPine, atenolol, calcium carbonate, cholecalciferol, cyclobenzaprine, hydroxychloroquine, lisinopril, multivitamin with minerals, and spironolactone      Allergies:  No Known Allergies    Vitals:   Temp:  [97.5 °F (36.4 °C)-98.3 °F (36.8 °C)] 98.1 °F (36.7 °C)  Heart Rate:  [70-92] 84  Resp:  [16-20] 16  BP: (118-156)/(58-88) 141/68    Physical Exam   She was awake and alert was not in any form of distress.  She was well-developed and fairly nourished.    She is stronger in both lower extremities.  The hip flexors, knee extensors and ankle dorsiflexors were 4+.    Deep tendon reflexes.      RESULT REVIEW:  I have personally reviewed the results from the time of this admission to 2025  17:55 EDT and agree with these findings:  []  Laboratory  []  Microbiology  []  Radiology  []  EKG/Telemetry   []  Cardiology/Vascular   []  Pathology  []  Old records  []  Other:  Most notable findings include:   May 12, 2025  She had a lumbar puncture done on May 12, 2025.  This spinal fluid was clear and colorless.  There were 2 white blood cells in the 4 to and none on the first tube.  There were 12 red blood cells in the first tube and 7 white blood cells in the fourth tube.    Spinal fluid sugar was 65  Fluid protein was 35.2.    The spinal fluid is unremarkable.  No evidence of any inflammation or infection.    I reviewed the digital images of the MRI of the thoracic spine that was done on May 12, 2025.  This study showed no acute changes.  There is multilevel disc ossified complex bulging from T6-T7 level without evidence of spinal cord or nerve root compression.  There were degenerated disc starting from T4-T5 level overall going down to T12-L1 levels.  The spinal cord itself appears normal.    I reviewed the digital images of the MRI of the brain that was done on May 11, 2025.  This study showed no acute changes.  There were moderate subcortical and periventricular white matter changes consistent with old microvascular ischemia.    May 11, 2025.  I reviewed the digital images of the CAT scan of her brain was done on May 10, 2025.  This study showed no acute changes.  There is moderate cortical atrophy which is more marked in the frontal and temporal regions.  There is moderate subcortical and periventricular white matter changes consistent with old microvascular ischemia.     I reviewed the digital images of the MRI of the cervical spine that was done on May 11, 2025.  This study showed no acute changes.  There is multilevel disc ossified complex bulging worse at C6-C7 levels without any evidence of nerve root or spinal cord compression.  There were no abnormal signal within the spinal cord.     I reviewed  the digital images of the MRI of the lumbosacral spine that was done on May 11, 2025.  This study showed no acute changes.  There were multilevel disc osteophyte complex bulging from T12 all the way down to S1 levels worse at T8 12 T11 and to some extent L4-L5 and L5-S1 levels.  There is no evidence of any spinal cord or nerve root compression.        Impression:    Headaches  Weakness both lower extremities  Chronic inflammatory demyelinating polyneuropathy  Essential tremors  Hyponatremia  Renal failure  Cervical spondylosis  Lumbosacral spondylosis           Plan:   The condition was discussed with her, her  and her son who are at the bedside.  Answered all her questions.  We will continue with the IVIG therapy.  Will start carbamazepine 100 mg 2 times a day orally.  Hopefully, this would help the pain and discomfort that she is having in both her legs and feet.        Electronically signed by Sue Caceres Jr., MD, 05/12/25, 5:55 PM EDT.        Please note that portions of this note were completed with a voice recognition program.  Part of this note is an electric or electronic transcription/translation of spoken language to printed text using the dragon dictating system.

## 2025-05-12 NOTE — PROGRESS NOTES
[facility]     Progress Note    Patient Name: Bettye France  : 1945  MRN: [id number]  Primary Care Physician:  Fredy Lopez MD  Date of admission: 5/10/2025      Subjective   Brief summary.  Patient admitted with weakness hyponatremia and dehydration      HPI:  Patient somewhat depressed anxious.  Seen by neurologist and diagnosed with CIDP.  Feels dry in the mouth.  Weak and fatigued      Review of Systems     Complains of tiredness and fatigue, muscle cramps and twitching.  No chest pain no shortness of breath.  No headache      Objective     Vitals:   Temp:  [97.5 °F (36.4 °C)-98.3 °F (36.8 °C)] 98.1 °F (36.7 °C)  Heart Rate:  [70-92] 84  Resp:  [16-20] 16  BP: (118-156)/(58-88) 141/68    Physical Exam :     Elderly female not in acute distress.  No icterus.  Oral mucosa dry.  Heart regular.  Lungs clear.  Abdomen soft and obese nontender.  Extremities no edema.  Neuro awake alert and oriented, generalized weakness and difficulty getting up and walking      Result Review:  I have personally reviewed the results from the time of this admission to 2025 19:16 EDT and agree with these findings:  [x]  Laboratory  []  Microbiology  []  Radiology  []  EKG/Telemetry   []  Cardiology/Vascular   []  Pathology  []  Old records  []  Other:  MRI reviewed.  CRP 45.  D-dimer 5.4         Assessment / Plan       Active Hospital Problems:  Active Hospital Problems    Diagnosis     **Hyponatremia     Dehydration     Hyperkalemia    Back pain    Plan:   MRI discussed and reviewed with neurologist.  CRP and D-dimer extremely high.  Possibility of CIDP.  LP ordered.  Check labs.  Continue fluids  .  Further management will be based on clinical course and recommendations from Dr. Caceres       VTE Prophylaxis:  Pharmacologic VTE prophylaxis orders are present.        CODE STATUS:   Code Status (Patient has no pulse and is not breathing): CPR (Attempt to Resuscitate)  Medical Interventions (Patient has pulse or is  breathing): Full Support          Fredy Lopez MD 05/12/25 19:16 EDT

## 2025-05-13 ENCOUNTER — APPOINTMENT (OUTPATIENT)
Facility: HOSPITAL | Age: 80
DRG: 641 | End: 2025-05-13
Payer: MEDICARE

## 2025-05-13 LAB
ANA SER QL: NEGATIVE
BH CV LOWER VASCULAR LEFT COMMON FEMORAL AUGMENT: NORMAL
BH CV LOWER VASCULAR LEFT COMMON FEMORAL COMPETENT: NORMAL
BH CV LOWER VASCULAR LEFT COMMON FEMORAL COMPRESS: NORMAL
BH CV LOWER VASCULAR LEFT COMMON FEMORAL PHASIC: NORMAL
BH CV LOWER VASCULAR LEFT COMMON FEMORAL SPONT: NORMAL
BH CV LOWER VASCULAR LEFT DISTAL FEMORAL COMPRESS: NORMAL
BH CV LOWER VASCULAR LEFT GASTRONEMIUS COMPRESS: NORMAL
BH CV LOWER VASCULAR LEFT GREATER SAPH AK COMPRESS: NORMAL
BH CV LOWER VASCULAR LEFT GREATER SAPH BK COMPRESS: NORMAL
BH CV LOWER VASCULAR LEFT LESSER SAPH COMPRESS: NORMAL
BH CV LOWER VASCULAR LEFT MID FEMORAL AUGMENT: NORMAL
BH CV LOWER VASCULAR LEFT MID FEMORAL COMPETENT: NORMAL
BH CV LOWER VASCULAR LEFT MID FEMORAL COMPRESS: NORMAL
BH CV LOWER VASCULAR LEFT MID FEMORAL PHASIC: NORMAL
BH CV LOWER VASCULAR LEFT MID FEMORAL SPONT: NORMAL
BH CV LOWER VASCULAR LEFT PERONEAL COMPRESS: NORMAL
BH CV LOWER VASCULAR LEFT POPLITEAL AUGMENT: NORMAL
BH CV LOWER VASCULAR LEFT POPLITEAL COMPETENT: NORMAL
BH CV LOWER VASCULAR LEFT POPLITEAL COMPRESS: NORMAL
BH CV LOWER VASCULAR LEFT POPLITEAL PHASIC: NORMAL
BH CV LOWER VASCULAR LEFT POPLITEAL SPONT: NORMAL
BH CV LOWER VASCULAR LEFT POSTERIOR TIBIAL COMPRESS: NORMAL
BH CV LOWER VASCULAR LEFT PROXIMAL FEMORAL COMPRESS: NORMAL
BH CV LOWER VASCULAR LEFT SAPHENOFEMORAL JUNCTION COMPRESS: NORMAL
BH CV LOWER VASCULAR RIGHT COMMON FEMORAL AUGMENT: NORMAL
BH CV LOWER VASCULAR RIGHT COMMON FEMORAL COMPETENT: NORMAL
BH CV LOWER VASCULAR RIGHT COMMON FEMORAL COMPRESS: NORMAL
BH CV LOWER VASCULAR RIGHT COMMON FEMORAL PHASIC: NORMAL
BH CV LOWER VASCULAR RIGHT COMMON FEMORAL SPONT: NORMAL
BH CV LOWER VASCULAR RIGHT DISTAL FEMORAL COMPRESS: NORMAL
BH CV LOWER VASCULAR RIGHT GASTRONEMIUS COMPRESS: NORMAL
BH CV LOWER VASCULAR RIGHT GREATER SAPH AK COMPRESS: NORMAL
BH CV LOWER VASCULAR RIGHT GREATER SAPH BK COMPRESS: NORMAL
BH CV LOWER VASCULAR RIGHT LESSER SAPH COMPRESS: NORMAL
BH CV LOWER VASCULAR RIGHT MID FEMORAL AUGMENT: NORMAL
BH CV LOWER VASCULAR RIGHT MID FEMORAL COMPETENT: NORMAL
BH CV LOWER VASCULAR RIGHT MID FEMORAL COMPRESS: NORMAL
BH CV LOWER VASCULAR RIGHT MID FEMORAL PHASIC: NORMAL
BH CV LOWER VASCULAR RIGHT MID FEMORAL SPONT: NORMAL
BH CV LOWER VASCULAR RIGHT PERONEAL COMPRESS: NORMAL
BH CV LOWER VASCULAR RIGHT POPLITEAL AUGMENT: NORMAL
BH CV LOWER VASCULAR RIGHT POPLITEAL COMPETENT: NORMAL
BH CV LOWER VASCULAR RIGHT POPLITEAL COMPRESS: NORMAL
BH CV LOWER VASCULAR RIGHT POPLITEAL PHASIC: NORMAL
BH CV LOWER VASCULAR RIGHT POPLITEAL SPONT: NORMAL
BH CV LOWER VASCULAR RIGHT POSTERIOR TIBIAL COMPRESS: NORMAL
BH CV LOWER VASCULAR RIGHT PROXIMAL FEMORAL COMPRESS: NORMAL
BH CV LOWER VASCULAR RIGHT SAPHENOFEMORAL JUNCTION COMPRESS: NORMAL
BH CV VAS PRELIMINARY FINDINGS SCRIPTING: 1
CHROMATIN AB SERPL-ACNC: <0.2 AI (ref 0–0.9)
DSDNA AB SER-ACNC: <1 IU/ML (ref 0–9)
EBV NA IGG SER IA-ACNC: <18 U/ML (ref 0–17.9)
ENA RNP AB SER-ACNC: 0.2 AI (ref 0–0.9)
ENA SM AB SER-ACNC: <0.2 AI (ref 0–0.9)
ENA SS-A AB SER-ACNC: <0.2 AI (ref 0–0.9)
ENA SS-B AB SER-ACNC: 0.8 AI (ref 0–0.9)
RHEUMATOID FACT SERPL-ACNC: 22.8 IU/ML
RPR SER-TITR: NON REACTIVE TITER

## 2025-05-13 PROCEDURE — 63710000001 DIPHENHYDRAMINE PER 50 MG: Performed by: PSYCHIATRY & NEUROLOGY

## 2025-05-13 PROCEDURE — 93970 EXTREMITY STUDY: CPT | Performed by: SURGERY

## 2025-05-13 PROCEDURE — 25010000002 ENOXAPARIN PER 10 MG: Performed by: INTERNAL MEDICINE

## 2025-05-13 PROCEDURE — 25010000002 IMMUNE GLOBULIN (HUMAN) 10 GM/100ML SOLUTION 100 ML VIAL: Performed by: PSYCHIATRY & NEUROLOGY

## 2025-05-13 PROCEDURE — 93970 EXTREMITY STUDY: CPT

## 2025-05-13 PROCEDURE — 25010000002 IMMUNE GLOBULIN (HUMAN) 20 GM/200ML SOLUTION 200 ML VIAL: Performed by: PSYCHIATRY & NEUROLOGY

## 2025-05-13 RX ADMIN — HYDROCODONE BITARTRATE AND ACETAMINOPHEN 1 TABLET: 5; 325 TABLET ORAL at 08:27

## 2025-05-13 RX ADMIN — ATENOLOL 25 MG: 25 TABLET ORAL at 20:03

## 2025-05-13 RX ADMIN — IMMUNE GLOBULIN (HUMAN) 30 G: 10 INJECTION INTRAVENOUS; SUBCUTANEOUS at 18:02

## 2025-05-13 RX ADMIN — DIPHENHYDRAMINE HYDROCHLORIDE 25 MG: 25 CAPSULE ORAL at 17:14

## 2025-05-13 RX ADMIN — CARBAMAZEPINE 100 MG: 100 TABLET, EXTENDED RELEASE ORAL at 20:03

## 2025-05-13 RX ADMIN — Medication 10 ML: at 20:06

## 2025-05-13 RX ADMIN — HYDROXYCHLOROQUINE SULFATE 200 MG: 200 TABLET ORAL at 08:16

## 2025-05-13 RX ADMIN — HYDROCODONE BITARTRATE AND ACETAMINOPHEN 1 TABLET: 5; 325 TABLET ORAL at 02:35

## 2025-05-13 RX ADMIN — FAMOTIDINE 40 MG: 20 TABLET, FILM COATED ORAL at 08:16

## 2025-05-13 RX ADMIN — ENOXAPARIN SODIUM 40 MG: 100 INJECTION SUBCUTANEOUS at 08:16

## 2025-05-13 RX ADMIN — Medication 10 ML: at 08:17

## 2025-05-13 RX ADMIN — Medication 1 TABLET: at 08:16

## 2025-05-13 RX ADMIN — Medication 1 TABLET: at 17:14

## 2025-05-13 RX ADMIN — SENNOSIDES AND DOCUSATE SODIUM 2 TABLET: 50; 8.6 TABLET ORAL at 08:27

## 2025-05-13 RX ADMIN — CARBAMAZEPINE 100 MG: 100 TABLET, EXTENDED RELEASE ORAL at 08:16

## 2025-05-13 RX ADMIN — ACETAMINOPHEN 650 MG: 325 TABLET ORAL at 17:14

## 2025-05-13 RX ADMIN — ATENOLOL 25 MG: 25 TABLET ORAL at 08:16

## 2025-05-13 NOTE — PLAN OF CARE
Goal Outcome Evaluation:  Plan of Care Reviewed With: patient        Progress: no change  Outcome Evaluation: Alert and oriented x4. x1 complaint of pain, medicated per mar. Up in chair this shift. Refused bed/chair alarm, educated on importance.

## 2025-05-13 NOTE — PROGRESS NOTES
[facility]     Progress Note    Patient Name: Bettye France  : 1945  MRN: [id number]  Primary Care Physician:  Fredy Lopez MD  Date of admission: 5/10/2025      Subjective   Brief summary.  Patient admitted with weakness hyponatremia and dehydration      HPI:  Patient sitting in chair today.  No new issues, still anxious and nervous.  Not able to get up and move, patient reports PT OT has not been done yet and they have been here only for few minutes      Review of Systems     Complains of tiredness and fatigue, muscle cramps and twitching.  No chest pain no shortness of breath.  No headache, leg swelling improving      Objective     Vitals:   Temp:  [97.5 °F (36.4 °C)-98.5 °F (36.9 °C)] 98.2 °F (36.8 °C)  Heart Rate:  [70-82] 70  Resp:  [16] 16  BP: (137-160)/(54-88) 144/54    Physical Exam :     Elderly female not in acute distress.  No icterus.    Heart regular.  Lungs clear.  Abdomen soft and obese nontender.  Extremities no edema.  Neuro awake alert and oriented, generalized weakness and difficulty getting up and walking      Result Review:  I have personally reviewed the results from the time of this admission to 2025 16:05 EDT and agree with these findings:  [x]  Laboratory  []  Microbiology  []  Radiology  []  EKG/Telemetry   []  Cardiology/Vascular   []  Pathology  []  Old records  []  Other:  MRI reviewed.  CRP 45.  D-dimer 5.4         Assessment / Plan       Active Hospital Problems:  Active Hospital Problems    Diagnosis     **Hyponatremia     Dehydration     Hyperkalemia    Back pain    Plan:   Stable started on IV IgG.  Continue treatment per neuro.  Venous Doppler negative for DVT.  PT OT consulted will see the recommendations for discharge planning       VTE Prophylaxis:  Pharmacologic VTE prophylaxis orders are present.        CODE STATUS:   Code Status (Patient has no pulse and is not breathing): CPR (Attempt to Resuscitate)  Medical Interventions (Patient has pulse or is  breathing): Full Support          Fredy Lopez MD 05/13/25 16:05 EDT

## 2025-05-13 NOTE — SIGNIFICANT NOTE
05/13/25 0924   Physical Therapy Time and Intention   Session Not Performed patient unavailable for treatment

## 2025-05-13 NOTE — PLAN OF CARE
Goal Outcome Evaluation:           Progress: no change  Outcome Evaluation: Pt AxOx4, on room air. IV,IG completed at begining of shift, pt tolerated well. Prn norco given throughout shift for pain. Lumbar puncture bandages are clean, dry, and intact. Pt has been resting well throughout the night. Pt has no needs or concerns at this time.

## 2025-05-13 NOTE — PROGRESS NOTES
[facility]     Progress Note             Patient Name: Bettye France  : 1945  MRN: [id number]  Primary Care Physician:  Fredy Lopez MD  Date of admission: 5/10/2025        Present illness:  She just came back from having some testing done.  She feels exhausted.  She has not noticed any change in the weakness in the lower extremities since yesterday.    Review of Systems  No headache, dizziness, nausea or vomiting.      Past Medical History:   Diagnosis Date    High blood pressure        Past Surgical History:   Procedure Laterality Date    APPENDECTOMY      CHOLECYSTECTOMY      COLONOSCOPY      Dr. Watters    HYSTERECTOMY         Family History: family history includes Cancer in her brother; Crohn's disease in her brother. Otherwise pertinent FHx was reviewed and not pertinent to current issue.    Social History:  reports that she has never smoked. She has never been exposed to tobacco smoke. She does not have any smokeless tobacco history on file. She reports that she does not drink alcohol and does not use drugs.      Home Medications:  acetaminophen, amLODIPine, atenolol, calcium carbonate, cholecalciferol, cyclobenzaprine, hydroxychloroquine, lisinopril, multivitamin with minerals, and spironolactone      Allergies:  No Known Allergies    Vitals:   Temp:  [97.5 °F (36.4 °C)-98.5 °F (36.9 °C)] 98.5 °F (36.9 °C)  Heart Rate:  [70-88] 74  Resp:  [16-18] 16  BP: (137-160)/(59-88) 156/62    RESULT REVIEW:  I have personally reviewed the results from the time of this admission to 2025 10:43 EDT and agree with these findings:  []  Laboratory  []  Microbiology  []  Radiology  []  EKG/Telemetry   []  Cardiology/Vascular   []  Pathology  []  Old records  []  Other:  Most notable findings include:   May 12, 2025  She had a lumbar puncture done on May 12, 2025.  This spinal fluid was clear and colorless.  There were 2 white blood cells in the 4 to and none on the first tube.  There were 12 red  blood cells in the first tube and 7 white blood cells in the fourth tube.     Spinal fluid sugar was 65  Fluid protein was 35.2.     The spinal fluid is unremarkable.  No evidence of any inflammation or infection.     I reviewed the digital images of the MRI of the thoracic spine that was done on May 12, 2025.  This study showed no acute changes.  There is multilevel disc ossified complex bulging from T6-T7 level without evidence of spinal cord or nerve root compression.  There were degenerated disc starting from T4-T5 level overall going down to T12-L1 levels.  The spinal cord itself appears normal.     I reviewed the digital images of the MRI of the brain that was done on May 11, 2025.  This study showed no acute changes.  There were moderate subcortical and periventricular white matter changes consistent with old microvascular ischemia.     May 11, 2025.  I reviewed the digital images of the CAT scan of her brain was done on May 10, 2025.  This study showed no acute changes.  There is moderate cortical atrophy which is more marked in the frontal and temporal regions.  There is moderate subcortical and periventricular white matter changes consistent with old microvascular ischemia.     I reviewed the digital images of the MRI of the cervical spine that was done on May 11, 2025.  This study showed no acute changes.  There is multilevel disc ossified complex bulging worse at C6-C7 levels without any evidence of nerve root or spinal cord compression.  There were no abnormal signal within the spinal cord.     I reviewed the digital images of the MRI of the lumbosacral spine that was done on May 11, 2025.  This study showed no acute changes.  There were multilevel disc osteophyte complex bulging from T12 all the way down to S1 levels worse at T8 12 T11 and to some extent L4-L5 and L5-S1 levels.  There is no evidence of any spinal cord or nerve root compression.        Impression:    Headaches  Weakness both lower  extremities  Chronic inflammatory demyelinating polyneuropathy  Essential tremors  Hyponatremia  Renal failure  Cervical spondylosis  Lumbosacral spondylosis           Plan:   Continue present treatment.         Electronically signed by Sue Caceres Jr., MD, 05/13/25, 10:43 AM EDT.        Please note that portions of this note were completed with a voice recognition program.  Part of this note is an electric or electronic transcription/translation of spoken language to printed text using the dragon dictating system.

## 2025-05-14 LAB
A PHAGOCYTOPH DNA BLD QL NAA+PROBE: NEGATIVE
ALBUMIN SERPL-MCNC: 2.4 G/DL (ref 3.5–5.2)
ALBUMIN/GLOB SERPL: 0.4 G/DL
ALP SERPL-CCNC: 106 U/L (ref 39–117)
ALT SERPL W P-5'-P-CCNC: 27 U/L (ref 1–33)
ANION GAP SERPL CALCULATED.3IONS-SCNC: 9.1 MMOL/L (ref 5–15)
AST SERPL-CCNC: 30 U/L (ref 1–32)
B HENSELAE IGG TITR SER IF: NEGATIVE TITER
B HENSELAE IGM TITR SER IF: NEGATIVE TITER
B QUINTANA IGG TITR SER IF: NEGATIVE TITER
B QUINTANA IGM TITR SER IF: NEGATIVE TITER
BABESIA SPEC: NEGATIVE
BASOPHILS # BLD AUTO: 0.07 10*3/MM3 (ref 0–0.2)
BASOPHILS NFR BLD AUTO: 0.8 % (ref 0–1.5)
BILIRUB SERPL-MCNC: 0.3 MG/DL (ref 0–1.2)
BUN SERPL-MCNC: 21 MG/DL (ref 8–23)
BUN/CREAT SERPL: 26.9 (ref 7–25)
CALCIUM SPEC-SCNC: 8.9 MG/DL (ref 8.6–10.5)
CHLORIDE SERPL-SCNC: 98 MMOL/L (ref 98–107)
CO2 SERPL-SCNC: 24.9 MMOL/L (ref 22–29)
CREAT SERPL-MCNC: 0.78 MG/DL (ref 0.57–1)
DEPRECATED RDW RBC AUTO: 42.5 FL (ref 37–54)
EGFRCR SERPLBLD CKD-EPI 2021: 77.9 ML/MIN/1.73
EHRLICHIA DNA SPEC QL NAA+PROBE: NEGATIVE
EOSINOPHIL # BLD AUTO: 0.11 10*3/MM3 (ref 0–0.4)
EOSINOPHIL NFR BLD AUTO: 1.2 % (ref 0.3–6.2)
ERYTHROCYTE [DISTWIDTH] IN BLOOD BY AUTOMATED COUNT: 12.2 % (ref 12.3–15.4)
GLOBULIN UR ELPH-MCNC: 5.5 GM/DL
GLUCOSE SERPL-MCNC: 93 MG/DL (ref 65–99)
HCT VFR BLD AUTO: 35 % (ref 34–46.6)
HGB BLD-MCNC: 11.5 G/DL (ref 12–15.9)
IMM GRANULOCYTES # BLD AUTO: 0.3 10*3/MM3 (ref 0–0.05)
IMM GRANULOCYTES NFR BLD AUTO: 3.4 % (ref 0–0.5)
LYMPHOCYTES # BLD AUTO: 0.77 10*3/MM3 (ref 0.7–3.1)
LYMPHOCYTES NFR BLD AUTO: 8.6 % (ref 19.6–45.3)
MCH RBC QN AUTO: 31 PG (ref 26.6–33)
MCHC RBC AUTO-ENTMCNC: 32.9 G/DL (ref 31.5–35.7)
MCV RBC AUTO: 94.3 FL (ref 79–97)
MONOCYTES # BLD AUTO: 0.98 10*3/MM3 (ref 0.1–0.9)
MONOCYTES NFR BLD AUTO: 10.9 % (ref 5–12)
NEUTROPHILS NFR BLD AUTO: 6.72 10*3/MM3 (ref 1.7–7)
NEUTROPHILS NFR BLD AUTO: 75.1 % (ref 42.7–76)
NRBC BLD AUTO-RTO: 0 /100 WBC (ref 0–0.2)
PLATELET # BLD AUTO: 329 10*3/MM3 (ref 140–450)
PMV BLD AUTO: 8.3 FL (ref 6–12)
POTASSIUM SERPL-SCNC: 3.9 MMOL/L (ref 3.5–5.2)
PROT SERPL-MCNC: 7.9 G/DL (ref 6–8.5)
RBC # BLD AUTO: 3.71 10*6/MM3 (ref 3.77–5.28)
RESULT COMMENT:: NORMAL
RICK SF IGG TITR SER: NORMAL {TITER}
RICK SF IGM TITR SER: NORMAL {TITER}
SARS-COV-2 AB SERPL IA-ACNC: 204 U/ML
SARS-COV-2 AB SERPL QL IA: POSITIVE
SARS-COV-2 AB SERPL-IMP: POSITIVE
SODIUM SERPL-SCNC: 132 MMOL/L (ref 136–145)
WBC NRBC COR # BLD AUTO: 8.95 10*3/MM3 (ref 3.4–10.8)

## 2025-05-14 PROCEDURE — 63710000001 DIPHENHYDRAMINE PER 50 MG: Performed by: PSYCHIATRY & NEUROLOGY

## 2025-05-14 PROCEDURE — 25010000002 ENOXAPARIN PER 10 MG: Performed by: INTERNAL MEDICINE

## 2025-05-14 PROCEDURE — 25010000002 IMMUNE GLOBULIN (HUMAN) 10 GM/100ML SOLUTION 100 ML VIAL: Performed by: PSYCHIATRY & NEUROLOGY

## 2025-05-14 PROCEDURE — 94761 N-INVAS EAR/PLS OXIMETRY MLT: CPT

## 2025-05-14 PROCEDURE — 97110 THERAPEUTIC EXERCISES: CPT

## 2025-05-14 PROCEDURE — 25010000002 IMMUNE GLOBULIN (HUMAN) 20 GM/200ML SOLUTION 200 ML VIAL: Performed by: PSYCHIATRY & NEUROLOGY

## 2025-05-14 PROCEDURE — 80053 COMPREHEN METABOLIC PANEL: CPT | Performed by: INTERNAL MEDICINE

## 2025-05-14 PROCEDURE — 85025 COMPLETE CBC W/AUTO DIFF WBC: CPT | Performed by: INTERNAL MEDICINE

## 2025-05-14 PROCEDURE — 97530 THERAPEUTIC ACTIVITIES: CPT

## 2025-05-14 PROCEDURE — 94799 UNLISTED PULMONARY SVC/PX: CPT

## 2025-05-14 RX ORDER — LISINOPRIL 20 MG/1
40 TABLET ORAL
Status: DISCONTINUED | OUTPATIENT
Start: 2025-05-14 | End: 2025-05-16 | Stop reason: HOSPADM

## 2025-05-14 RX ADMIN — POLYETHYLENE GLYCOL 3350 17 G: 17 POWDER, FOR SOLUTION ORAL at 18:12

## 2025-05-14 RX ADMIN — Medication 10 ML: at 20:23

## 2025-05-14 RX ADMIN — HYDROXYCHLOROQUINE SULFATE 200 MG: 200 TABLET ORAL at 09:23

## 2025-05-14 RX ADMIN — ATENOLOL 25 MG: 25 TABLET ORAL at 20:22

## 2025-05-14 RX ADMIN — FAMOTIDINE 40 MG: 20 TABLET, FILM COATED ORAL at 09:23

## 2025-05-14 RX ADMIN — Medication 1 TABLET: at 17:49

## 2025-05-14 RX ADMIN — ENOXAPARIN SODIUM 40 MG: 100 INJECTION SUBCUTANEOUS at 09:22

## 2025-05-14 RX ADMIN — ATENOLOL 25 MG: 25 TABLET ORAL at 09:23

## 2025-05-14 RX ADMIN — ACETAMINOPHEN 650 MG: 325 TABLET ORAL at 17:49

## 2025-05-14 RX ADMIN — HYDROCODONE BITARTRATE AND ACETAMINOPHEN 1 TABLET: 5; 325 TABLET ORAL at 12:33

## 2025-05-14 RX ADMIN — CARBAMAZEPINE 100 MG: 100 TABLET, EXTENDED RELEASE ORAL at 09:23

## 2025-05-14 RX ADMIN — IMMUNE GLOBULIN (HUMAN) 30 G: 10 INJECTION INTRAVENOUS; SUBCUTANEOUS at 17:50

## 2025-05-14 RX ADMIN — Medication 10 ML: at 09:23

## 2025-05-14 RX ADMIN — LISINOPRIL 40 MG: 20 TABLET ORAL at 11:17

## 2025-05-14 RX ADMIN — DIPHENHYDRAMINE HYDROCHLORIDE 25 MG: 25 CAPSULE ORAL at 17:50

## 2025-05-14 RX ADMIN — Medication 1 TABLET: at 09:23

## 2025-05-14 RX ADMIN — CARBAMAZEPINE 100 MG: 100 TABLET, EXTENDED RELEASE ORAL at 20:22

## 2025-05-14 NOTE — THERAPY TREATMENT NOTE
Acute Care - Physical Therapy Treatment Note   [facility]     Patient Name: Bettye France  : 1945  MRN: [id number]  Today's Date: 2025      Visit Dx:     ICD-10-CM ICD-9-CM   1. Hyponatremia  E87.1 276.1   2. DEVORAH (acute kidney injury)  N17.9 584.9   3. Impaired mobility and ADLs  Z74.09 V49.89    Z78.9    4. Difficulty walking  R26.2 719.7     Patient Active Problem List   Diagnosis    Hyponatremia    Dehydration    Hyperkalemia     Past Medical History:   Diagnosis Date    High blood pressure      Past Surgical History:   Procedure Laterality Date    APPENDECTOMY      CHOLECYSTECTOMY      COLONOSCOPY      Dr. Wattesr    HYSTERECTOMY       PT Assessment (Last 12 Hours)       PT Evaluation and Treatment       Row Name 25 0958          Physical Therapy Time and Intention    Subjective Information complains of;weakness;fatigue;pain  -DK     Document Type therapy note (daily note)  -DK     Mode of Treatment individual therapy;physical therapy  -DK     Patient Effort good  -DK     Symptoms Noted During/After Treatment fatigue;increased pain  -DK     Comment Pt reports left knee pain, feeling weak / fatigued. She reports not remembering having ambulated with the PT 2 days prior.  -DK       Row Name 25 0958          Pain    Pretreatment Pain Rating 5/10  -DK     Posttreatment Pain Rating 5/10  -DK     Pain Location back;knee  -DK     Pain Side/Orientation left;generalized  -DK     Pain Management Interventions exercise or physical activity utilized  -DK       Row Name 25 0958          Cognition    Affect/Mental Status (Cognition) WFL  -DK     Orientation Status (Cognition) oriented x 4  -DK     Follows Commands (Cognition) WFL  -DK     Cognitive Function (Cognition) WFL  -DK     Personal Safety Interventions gait belt;nonskid shoes/slippers when out of bed;supervised activity  -DK       Row Name 25 0958          Bed Mobility    Bed Mobility supine-sit  -DK     All Activities,  [place] (Bed Mobility) standby assist  -DK     Supine-Sit [place] (Bed Mobility) standby assist  -DK     Bed Mobility, Safety Issues decreased use of arms for pushing/pulling;decreased use of legs for bridging/pushing  -DK     Assistive Device (Bed Mobility) bed rails  -DK       Row Name 05/14/25 0958          Transfers    Transfers sit-stand transfer;stand-sit transfer  -DK       Row Name 05/14/25 0958          Sit-Stand Transfer    Sit-Stand [place] (Transfers) standby assist;contact guard;1 person assist  -DK     Assistive Device (Sit-Stand Transfers) walker, front-wheeled  -DK       Row Name 05/14/25 0958          Stand-Sit Transfer    Stand-Sit [place] (Transfers) standby assist;contact guard;1 person assist  -DK     Assistive Device (Stand-Sit Transfers) walker, front-wheeled  -DK       Row Name 05/14/25 0958          Gait/Stairs (Locomotion)    Gait/Stairs Locomotion gait/ambulation independence;gait/ambulation assistive device;distance ambulated;gait pattern  -DK     [place] Level (Gait) standby assist;contact guard;1 person assist  -DK     Assistive Device (Gait) walker, front-wheeled  -DK     Distance in Feet (Gait) 15  -DK     Pattern (Gait) step-to  -DK     Deviations/Abnormal Patterns (Gait) pradeep decreased;festinating/shuffling;gait speed decreased;stride length decreased  -DK     Bilateral Gait Deviations forward flexed posture  -DK     Comment, (Gait/Stairs) Pt ambulated on room air with a rolling walker.  She was left in the recliner post treatment.  -       Row Name 05/14/25 0958          Safety Issues/Impairments Affecting Functional Mobility    Impairments Affecting Function (Mobility) balance;endurance/activity tolerance;pain;strength  -       Row Name 05/14/25 0958          Balance    Balance Assessment sitting static balance;sitting dynamic balance;standing static balance;standing dynamic balance  -DK     Static Sitting Balance standby assist  -DK     Dynamic  Sitting Balance standby assist  -DK     Position, Sitting Balance unsupported;sitting edge of bed;sitting in chair  -DK     Static Standing Balance standby assist;contact guard;1-person assist  -DK     Dynamic Standing Balance standby assist;contact guard;1-person assist  -DK     Position/Device Used, Standing Balance walker, front-wheeled  -     Balance Interventions standing;dynamic;tandem gait  -       Row Name 05/14/25 0958          Motor Skills    Motor Skills --  therapeutic exercises  -     Coordination WFL  -     Therapeutic Exercise hip;knee;ankle  -       Row Name 05/14/25 0958          Hip (Therapeutic Exercise)    Hip (Therapeutic Exercise) AROM (active range of motion)  -     Hip AROM (Therapeutic Exercise) bilateral;flexion;extension;aBduction;aDduction;sitting;10 repetitions  -       Row Name 05/14/25 0958          Knee (Therapeutic Exercise)    Knee (Therapeutic Exercise) AROM (active range of motion)  -     Knee AROM (Therapeutic Exercise) bilateral;flexion;extension;LAQ (long arc quad);sitting;10 repetitions  -       Row Name 05/14/25 0958          Ankle (Therapeutic Exercise)    Ankle (Therapeutic Exercise) AROM (active range of motion)  -     Ankle AROM (Therapeutic Exercise) bilateral;dorsiflexion;plantarflexion;sitting;15 repititions  -       Row Name 05/14/25 0958          Plan of Care Review    Plan of Care Reviewed With patient  -     Progress improving  -       Row Name 05/14/25 0958          Positioning and Restraints    Pre-Treatment Position in bed  -DK     Post Treatment Position chair  -DK     In Chair reclined;call light within reach;encouraged to call for assist;legs elevated;heels elevated  -               User Key  (r) = Recorded By, (t) = Taken By, (c) = Cosigned By      Initials Name Provider Type    Chantale Benjamin PTA Physical Therapist Assistant                    Physical Therapy Education       Title: PT OT SLP Therapies (In Progress)        Topic: Physical Therapy (In Progress)       Point: Mobility training (Done)       Learning Progress Summary            Patient Acceptance, E,TB, VU by AV at 5/12/2025 1552                      Point: Home exercise program (Not Started)       Learner Progress:  Not documented in this visit.              Point: Body mechanics (Done)       Learning Progress Summary            Patient Acceptance, E,TB, VU by AV at 5/12/2025 1552                      Point: Precautions (Done)       Learning Progress Summary            Patient Acceptance, E,TB, VU by AV at 5/12/2025 1552                                      User Key       Initials Effective Dates Name Provider Type Discipline     06/11/21 -  King Bynum, TANESHA Physical Therapist PT                  PT Recommendation and Plan     Plan of Care Reviewed With: patient  Progress: improving   Outcome Measures       Row Name 05/14/25 0958 05/12/25 1500          How much help from another person do you currently need...    Turning from your back to your side while in flat bed without using bedrails? 4  -DK 4  -AV     Moving from lying on back to sitting on the side of a flat bed without bedrails? 4  -DK 3  -AV     Moving to and from a bed to a chair (including a wheelchair)? 3  -DK 3  -AV     Standing up from a chair using your arms (e.g., wheelchair, bedside chair)? 3  -DK 3  -AV     Climbing 3-5 steps with a railing? 2  -DK 2  -AV     To walk in hospital room? 3  -DK 3  -AV     AM-PAC 6 Clicks Score (PT) 19  -DK 18  -AV        Functional Assessment    Outcome Measure Options AM-PAC 6 Clicks Basic Mobility (PT)  -DK AM-PAC 6 Clicks Basic Mobility (PT)  -AV               User Key  (r) = Recorded By, (t) = Taken By, (c) = Cosigned By      Initials Name Provider Type    Chantale Benjamin, PTA Physical Therapist Assistant    AV King Bynum, TANESHA Physical Therapist                     Time Calculation:    PT Charges       Row Name 05/14/25 1003             Time Calculation     PT Received On 05/14/25  -DK      PT Goal Re-Cert Due Date 05/21/25  -DK         Timed Charges    95045 - PT Therapeutic Exercise Minutes 12  -DK      61187 - Gait Training Minutes  5  -DK      67905 - PT Therapeutic Activity Minutes 7  -DK         Total Minutes    Timed Charges Total Minutes 24  -DK       Total Minutes 24  -DK                User Key  (r) = Recorded By, (t) = Taken By, (c) = Cosigned By      Initials Name Provider Type    Chantale Benjamin PTA Physical Therapist Assistant                  Therapy Charges for Today       Code Description Service Date Service Provider Modifiers Qty    28576599625 HC PT THER PROC EA 15 MIN 5/14/2025 Chantale Sanz PTA GP 1    27841873482 HC PT THERAPEUTIC ACT EA 15 MIN 5/14/2025 Chantale Sanz PTA GP 1            PT G-Codes  Outcome Measure Options: AM-PAC 6 Clicks Basic Mobility (PT)  AM-PAC 6 Clicks Score (PT): 19  AM-PAC 6 Clicks Score (OT): 21    Chantale Sanz PTA  5/14/2025

## 2025-05-14 NOTE — PLAN OF CARE
Chief complaint:   Chief Complaint   Patient presents with   • Menstrual Problem       Vitals:  Visit Vitals  /65 (BP Location: LUE - Left upper extremity, Patient Position: Standing, Cuff Size: Regular)   Pulse 72   Temp 98.6 °F (37 °C) (Oral)   Resp 16   Ht 5' 5\" (1.651 m)   Wt 62.6 kg (138 lb)   LMP 04/13/2025 (Exact Date)   SpO2 98%   BMI 22.96 kg/m²       HISTORY OF PRESENT ILLNESS     HPI 31 y.o. female presents to urgent care with cc of vaginal bleeding ongoing for 10 days.  States she is had 4 saturated pads today.  States intermittent lightheadedness over the past couple of days.  Patient denies syncopal episode.  Denies CP, SOB, palipitations prior to presyncopal event.  States she has had intermittent headaches.  States she took 600 mg ibuprofen at 8 AM, states that she would like Toradol for pain control while in urgent care.  Denies any other complaints.      Other significant problems:  Patient Active Problem List    Diagnosis Date Noted   • Low grade squamous intraepithelial lesion on cytologic smear of cervix (LGSIL) 01/03/2018     Priority: Low     1/3/18- Pap         PAST MEDICAL, FAMILY AND SOCIAL HISTORY     Medications:  Current Outpatient Medications   Medication Sig Dispense Refill   • naproxen (NAPROSYN) 500 MG tablet Take 1 tablet by mouth 2 times daily as needed for Pain. 14 tablet 0   • levothyroxine 25 MCG tablet Take 1 tablet by mouth daily. 90 tablet 0   • fluconazole (DIFLUCAN) 150 MG tablet Take one now and may repeat in 3-4 d if symptoms do not resolve (Patient not taking: Reported on 4/24/2025) 2 tablet 0   • cetirizine (ZyrTEC Allergy) 10 MG tablet Take 1 tablet by mouth daily. 90 tablet 0   • tacrolimus (PROTOPIC) 0.1 % ointment Apply topically 2 times daily. To areas of rash on the eyelids 60 g 3     No current facility-administered medications for this visit.       Allergies:  ALLERGIES:  No Known Allergies    Past Medical  History/Surgeries:  Past Medical History:  Goal Outcome Evaluation:  Plan of Care Reviewed With: patient        Progress: no change  Outcome Evaluation: pt is axox4 c/o of necl/back pain, medicated per mar. ambulated x1. IVIG given. no other concerns noted.                                Diagnosis Date   • Anemia    • Bacterial vaginosis 2109   • NO HX OF     CA, HTN, DM, CAD, CVA, DVT, MI, or liver disease       Past Surgical History:   Procedure Laterality Date   • Breast surgery  02/09/2020    Bilateral breast implants   • Cosmetic abdominoplasty tummy tuck  02/09/2020    \"Mini-tummy tuck\"   • Enlarge breast  02/2023    suture repair, new implants   • Hb etonogestrel (nexplanon) implant  09/2018   • Iud mirena      Change to Nexplanon   • Keloid excision     • Salpingectomy  2019    [facility]       Family History:  Family History   Problem Relation Age of Onset   • Patient is unaware of any medical problems Mother    • Patient is unaware of any medical problems Father    • Patient is unaware of any medical problems Sister    • Patient is unaware of any medical problems Sister    • Patient is unaware of any medical problems Brother    • Patient is unaware of any medical problems Brother    • Patient is unaware of any medical problems Brother    • Diabetes Maternal Grandmother    • Patient is unaware of any medical problems Daughter    • Patient is unaware of any medical problems Son    • Diabetes Maternal Aunt    • Diabetes Paternal Uncle        Social History:  Social History     Tobacco Use   • Smoking status: Light Smoker     Passive exposure: Current   • Smokeless tobacco: Current   • Tobacco comments:     vaping   Substance Use Topics   • Alcohol use: Yes     Comment: socially       REVIEW OF SYSTEMS     Review of Systems   Constitutional: Negative.    Genitourinary:  Positive for vaginal bleeding. Negative for decreased urine volume, difficulty urinating, dysuria, enuresis, flank pain, frequency, genital sores, hematuria, menstrual problem, pelvic pain, urgency, vaginal discharge and vaginal pain.   Neurological:  Positive for light-headedness and headaches. Negative for tremors, seizures, syncope, facial asymmetry, speech difficulty, weakness and numbness.       PHYSICAL EXAM     Physical  Exam  Constitutional:       Appearance: Normal appearance.   HENT:      Head: Normocephalic.      Right Ear: Tympanic membrane normal.      Neck: Normal range of motion and neck supple. No rigidity or tenderness.   Eyes:      Extraocular Movements: Extraocular movements intact.      Pupils: Pupils are equal, round, and reactive to light.      Comments: No nystagmus.   Cardiovascular:      Rate and Rhythm: Normal rate and regular rhythm.   Pulmonary:      Effort: Pulmonary effort is normal.      Breath sounds: Normal breath sounds.   Abdominal:      General: Abdomen is flat. Bowel sounds are normal. There is no distension.      Palpations: Abdomen is soft. There is no mass.      Tenderness: There is no abdominal tenderness. There is no guarding or rebound.      Hernia: No hernia is present.   Genitourinary:     Comments: Defers  Lymphadenopathy:      Cervical: No cervical adenopathy.   Skin:     General: Skin is warm and dry.   Neurological:      General: No focal deficit present.      Mental Status: She is alert and oriented to person, place, and time.      Comments: Negative Romberg.  Normal finger to nose bilaterally.  Normal rapidly alternating movements and fine motor movements bilaterally in upper and lower extremities.  Strength 5/5 bilaterally in upper and lower extremities.  Normal heel to shin.  Non focal exam.       Psychiatric:         Mood and Affect: Mood normal.         Behavior: Behavior normal.         ASSESSMENT/PLAN     1.  (genitourinary) symptoms    2. Lightheadedness    3. Acute nonintractable headache, unspecified headache type      Reassessed patient.  Discussed lab results.  Patient defers further workup at this time.  Instructed to go to ER if having new or worsening symptoms and to follow up with PCP in 2-3 days.  Discussed risks/benefits of medications to be given.  Patient displays good understanding of all topics discussed and is in agreeance with plan.    MDM-Patient presents to  urgent care chief complaint of vaginal bleeding with patient being afebrile non hypotensive, non hypoxic with reassuring physical exam.  Urine pregnancy negative.  Breath was negative.  Defers further workup.  Will prescribe naproxen.

## 2025-05-14 NOTE — PROGRESS NOTES
[facility]     Progress Note             Patient Name: Bettye France  : 1945  MRN: [id number]  Primary Care Physician:  Fredy Lopez MD  Date of admission: 5/10/2025        Present illness:  She is doing better.  She was able to stand up and walk with her walker with assistance from the physical therapist.  She is moving the lower extremities better.  She is able to wiggle toes better.    The strength of both upper extremities were 5/5.  Both lower extremities such as the hip flexors knee extensors 4+5 on both sides.    Review of Systems  No headache, dizziness, nausea or vomiting.  She is not incontinent of her urine.      Past Medical History:   Diagnosis Date    High blood pressure        Past Surgical History:   Procedure Laterality Date    APPENDECTOMY      CHOLECYSTECTOMY      COLONOSCOPY      Dr. Watters    HYSTERECTOMY         Family History: family history includes Cancer in her brother; Crohn's disease in her brother. Otherwise pertinent FHx was reviewed and not pertinent to current issue.    Social History:  reports that she has never smoked. She has never been exposed to tobacco smoke. She does not have any smokeless tobacco history on file. She reports that she does not drink alcohol and does not use drugs.      Home Medications:  acetaminophen, amLODIPine, atenolol, calcium carbonate, cholecalciferol, cyclobenzaprine, hydroxychloroquine, lisinopril, multivitamin with minerals, and spironolactone      Allergies:  No Known Allergies    Vitals:   Temp:  [97.7 °F (36.5 °C)-98.2 °F (36.8 °C)] 98.2 °F (36.8 °C)  Heart Rate:  [72-86] 78  Resp:  [16-18] 18  BP: (143-160)/(56-75) 144/73    RESULT REVIEW:  I have personally reviewed the results from the time of this admission to 2025 13:53 EDT and agree with these findings:  []  Laboratory  []  Microbiology  []  Radiology  []  EKG/Telemetry   []  Cardiology/Vascular   []  Pathology  []  Old records  []  Other:  Most notable findings  include:     Most notable findings include:   May 12, 2025  She had a lumbar puncture done on May 12, 2025.  This spinal fluid was clear and colorless.  There were 2 white blood cells in the 4 to and none on the first tube.  There were 12 red blood cells in the first tube and 7 white blood cells in the fourth tube.     Spinal fluid sugar was 65  Fluid protein was 35.2.     The spinal fluid is unremarkable.  No evidence of any inflammation or infection.     I reviewed the digital images of the MRI of the thoracic spine that was done on May 12, 2025.  This study showed no acute changes.  There is multilevel disc ossified complex bulging from T6-T7 level without evidence of spinal cord or nerve root compression.  There were degenerated disc starting from T4-T5 level overall going down to T12-L1 levels.  The spinal cord itself appears normal.     I reviewed the digital images of the MRI of the brain that was done on May 11, 2025.  This study showed no acute changes.  There were moderate subcortical and periventricular white matter changes consistent with old microvascular ischemia.     May 11, 2025.  I reviewed the digital images of the CAT scan of her brain was done on May 10, 2025.  This study showed no acute changes.  There is moderate cortical atrophy which is more marked in the frontal and temporal regions.  There is moderate subcortical and periventricular white matter changes consistent with old microvascular ischemia.     I reviewed the digital images of the MRI of the cervical spine that was done on May 11, 2025.  This study showed no acute changes.  There is multilevel disc ossified complex bulging worse at C6-C7 levels without any evidence of nerve root or spinal cord compression.  There were no abnormal signal within the spinal cord.     I reviewed the digital images of the MRI of the lumbosacral spine that was done on May 11, 2025.  This study showed no acute changes.  There were multilevel disc osteophyte  complex bulging from T12 all the way down to S1 levels worse at T8 12 T11 and to some extent L4-L5 and L5-S1 levels.  There is no evidence of any spinal cord or nerve root compression.        Impression:    Headaches  Weakness both lower extremities, improving  Chronic inflammatory demyelinating polyneuropathy  Essential tremors  Hyponatremia  Renal failure  Cervical spondylosis  Lumbosacral spondylosis           Plan:   Continue present treatment.    Electronically signed by Sue Caceres Jr., MD, 05/14/25, 1:53 PM EDT.        Please note that portions of this note were completed with a voice recognition program.  Part of this note is an electric or electronic transcription/translation of spoken language to printed text using the dragon dictating system.

## 2025-05-14 NOTE — PLAN OF CARE
Goal Outcome Evaluation:           Progress: improving  Outcome Evaluation: Pt AOx4 throughout the shift, on room air, and x1 assist to the BSC. Pt had no complaints of pain or discomfort noted this shift. IVIG completed per orders. Bed alarm refused, VSS, no acute changes noted this shift. Pt appears to be in no apparent distress at this time, denies any current needs, and has call light within reach.

## 2025-05-14 NOTE — PROGRESS NOTES
[facility]     Progress Note    Patient Name: Bettye France  : 1945  MRN: [id number]  Primary Care Physician:  Fredy Lopez MD  Date of admission: 5/10/2025      Subjective   Brief summary.  Patient admitted with weakness hyponatremia and dehydration      HPI:  Patient seen earlier this morning.  Dry mouth.  Blood pressure high.  Anxious.  Continues to remain weak      Review of Systems     No chest pain, no shortness of breath.  Hurting, weak.  Difficulty walking.  Back hurts    Objective     Vitals:   Temp:  [97.7 °F (36.5 °C)-98.2 °F (36.8 °C)] 98 °F (36.7 °C)  Heart Rate:  [70-86] 80  Resp:  [16-18] 18  BP: (143-160)/(54-75) 160/58    Physical Exam :     Elderly female not in acute distress.  No icterus.   Oral mucosa dry and crusty lips.  Heart regular.  Lungs clear.  Abdomen soft and obese nontender.  Extremities no edema.  Neuro awake alert and oriented, generalized weakness and difficulty getting up and walking      Result Review:  I have personally reviewed the results from the time of this admission to 2025 09:21 EDT and agree with these findings:  [x]  Laboratory  []  Microbiology  []  Radiology  []  EKG/Telemetry   []  Cardiology/Vascular   []  Pathology  []  Old records  []  Other:           Assessment / Plan       Active Hospital Problems:  Active Hospital Problems    Diagnosis     **Hyponatremia     Dehydration     Hyperkalemia    Back pain    Plan:   Continue IVIG per neuro.  Blood pressure high restart home meds.  Encourage p.o. fluids.  Monitor labs       VTE Prophylaxis:  Pharmacologic VTE prophylaxis orders are present.        CODE STATUS:   Code Status (Patient has no pulse and is not breathing): CPR (Attempt to Resuscitate)  Medical Interventions (Patient has pulse or is breathing): Full Support          Fredy Lopez MD 25 09:21 EDT

## 2025-05-15 PROBLEM — I10 ESSENTIAL HYPERTENSION: Chronic | Status: ACTIVE | Noted: 2025-05-15

## 2025-05-15 LAB
ALB CSF/SERPL: 7 {RATIO} (ref 0–8)
ALBUMIN CSF-MCNC: 21 MG/DL (ref 10–46)
ALBUMIN SERPL-MCNC: 2.9 G/DL (ref 3.8–4.8)
IGG CSF-MCNC: 3.6 MG/DL (ref 0–6.7)
IGG SERPL-MCNC: 1716 MG/DL (ref 586–1602)
IGG SYNTH RATE SER+CSF CALC-MRATE: -15.9 MG/DAY
IGG/ALB CLEAR SER+CSF-RTO: 0.3 (ref 0–0.7)
IGG/ALB CSF: 0.17 {RATIO} (ref 0–0.25)
MBP CSF-MCNC: 5.3 NG/ML (ref 0–5.6)
OLIGOCLONAL BANDS.IT SER+CSF QL: ABNORMAL

## 2025-05-15 PROCEDURE — 97530 THERAPEUTIC ACTIVITIES: CPT

## 2025-05-15 PROCEDURE — 25010000002 ENOXAPARIN PER 10 MG: Performed by: INTERNAL MEDICINE

## 2025-05-15 PROCEDURE — 97110 THERAPEUTIC EXERCISES: CPT

## 2025-05-15 RX ADMIN — Medication 10 ML: at 20:35

## 2025-05-15 RX ADMIN — ENOXAPARIN SODIUM 40 MG: 100 INJECTION SUBCUTANEOUS at 09:23

## 2025-05-15 RX ADMIN — CARBAMAZEPINE 100 MG: 100 TABLET, EXTENDED RELEASE ORAL at 09:23

## 2025-05-15 RX ADMIN — CARBAMAZEPINE 100 MG: 100 TABLET, EXTENDED RELEASE ORAL at 20:35

## 2025-05-15 RX ADMIN — FAMOTIDINE 40 MG: 20 TABLET, FILM COATED ORAL at 09:23

## 2025-05-15 RX ADMIN — Medication 10 ML: at 09:24

## 2025-05-15 RX ADMIN — POLYETHYLENE GLYCOL 3350 17 G: 17 POWDER, FOR SOLUTION ORAL at 09:30

## 2025-05-15 RX ADMIN — Medication 1 TABLET: at 09:23

## 2025-05-15 RX ADMIN — ATENOLOL 25 MG: 25 TABLET ORAL at 09:23

## 2025-05-15 RX ADMIN — LISINOPRIL 40 MG: 20 TABLET ORAL at 09:23

## 2025-05-15 RX ADMIN — Medication 1 TABLET: at 17:54

## 2025-05-15 RX ADMIN — HYDROCODONE BITARTRATE AND ACETAMINOPHEN 1 TABLET: 5; 325 TABLET ORAL at 01:13

## 2025-05-15 RX ADMIN — ATENOLOL 25 MG: 25 TABLET ORAL at 20:35

## 2025-05-15 RX ADMIN — TEMAZEPAM 15 MG: 15 CAPSULE ORAL at 20:48

## 2025-05-15 RX ADMIN — HYDROXYCHLOROQUINE SULFATE 200 MG: 200 TABLET ORAL at 09:23

## 2025-05-15 NOTE — PROGRESS NOTES
[facility]     Progress Note    Patient Name: Bettye France  : 1945  MRN: [id number]  Primary Care Physician:  Fredy Lopez MD  Date of admission: 5/10/2025      Subjective   Brief summary.  Patient admitted with weakness hyponatremia and dehydration      HPI:  Patient feels slightly worse today.  Seen earlier this morning.  No chest pain no shortness of breath.  Feels weak.  Blood pressure was high yesterday restarted Norvas    Review of Systems     Continues to have weakness and difficulty walking.  Bilateral leg pain.    Objective     Vitals:   Temp:  [97.6 °F (36.4 °C)-98.2 °F (36.8 °C)] 97.9 °F (36.6 °C)  Heart Rate:  [67-80] 67  Resp:  [16-20] 16  BP: (118-165)/(58-75) 118/64    Physical Exam :     Elderly female not in acute distress.  Tired looking.  Heart regular..  Lungs clear.  Abdomen soft and obese nontender.  Extremities no edema.  Neuro awake alert and oriented, generalized weakness and difficulty getting up and walking      Result Review:  I have personally reviewed the results from the time of this admission to 5/15/2025 06:33 EDT and agree with these findings:  [x]  Laboratory  []  Microbiology  []  Radiology  []  EKG/Telemetry   []  Cardiology/Vascular   []  Pathology  []  Old records  []  Other:           Assessment / Plan       Active Hospital Problems:  Active Hospital Problems    Diagnosis     **Hyponatremia     Essential hypertension     Dehydration     Hyperkalemia    Back pain    Plan:   Continue IVIG per neuro.  Blood pressure better  Continue PT OT.  Discussed with patient to consider rehab .   and discharge planner on board  VTE Prophylaxis:  Pharmacologic VTE prophylaxis orders are present.        CODE STATUS:   Code Status (Patient has no pulse and is not breathing): CPR (Attempt to Resuscitate)  Medical Interventions (Patient has pulse or is breathing): Full Support          Fredy Lopez MD 05/15/25 06:33 EDT

## 2025-05-15 NOTE — THERAPY TREATMENT NOTE
Acute Care - Physical Therapy Treatment Note   [facility]     Patient Name: Bettye France  : 1945  MRN: [id number]  Today's Date: 5/15/2025      Visit Dx:     ICD-10-CM ICD-9-CM   1. Hyponatremia  E87.1 276.1   2. DEVORAH (acute kidney injury)  N17.9 584.9   3. Impaired mobility and ADLs  Z74.09 V49.89    Z78.9    4. Difficulty walking  R26.2 719.7     Patient Active Problem List   Diagnosis    Hyponatremia    Dehydration    Hyperkalemia    Essential hypertension     Past Medical History:   Diagnosis Date    High blood pressure      Past Surgical History:   Procedure Laterality Date    APPENDECTOMY      CHOLECYSTECTOMY      COLONOSCOPY      Dr. Watters    HYSTERECTOMY       PT Assessment (Last 12 Hours)       PT Evaluation and Treatment       Row Name 05/15/25 0858          Physical Therapy Time and Intention    Subjective Information complains of;weakness;fatigue;pain  -DK     Document Type therapy note (daily note)  -DK     Mode of Treatment individual therapy;physical therapy  -DK     Patient Effort good  -DK     Symptoms Noted During/After Treatment fatigue;increased pain  -DK     Comment Pt reports still feeling weak, c/o cervical pain.  She states she is debating on going to inpatient rehab.  -DK       Row Name 05/15/25 0858          Pain    Pretreatment Pain Rating 5/10  -DK     Posttreatment Pain Rating 5/10  -DK     Pain Location back;knee;neck  -DK     Pain Side/Orientation generalized  -DK     Pain Management Interventions exercise or physical activity utilized  -DK       Row Name 05/15/25 0858          Cognition    Affect/Mental Status (Cognition) WFL  -DK     Orientation Status (Cognition) oriented x 4  -DK     Follows Commands (Cognition) WFL  -DK     Cognitive Function (Cognition) WFL  -DK     Personal Safety Interventions gait belt;nonskid shoes/slippers when out of bed;supervised activity  -DK       Row Name 05/15/25 0858          Bed Mobility    Bed Mobility supine-sit  -DK     All  Activities, [place] (Bed Mobility) standby assist  -     Supine-Sit [place] (Bed Mobility) standby assist  -DK     Bed Mobility, Safety Issues decreased use of arms for pushing/pulling;decreased use of legs for bridging/pushing  -DK     Assistive Device (Bed Mobility) bed rails  -       Row Name 05/15/25 0858          Transfers    Transfers sit-stand transfer;stand-sit transfer  -       Row Name 05/15/25 0858          Sit-Stand Transfer    Sit-Stand [place] (Transfers) standby assist;contact guard;1 person assist  -DK     Assistive Device (Sit-Stand Transfers) walker, front-wheeled  -DK       Row Name 05/15/25 0858          Stand-Sit Transfer    Stand-Sit [place] (Transfers) standby assist;contact guard;1 person assist  -DK     Assistive Device (Stand-Sit Transfers) walker, front-wheeled  -DK       Row Name 05/15/25 0858          Gait/Stairs (Locomotion)    Gait/Stairs Locomotion gait/ambulation independence;gait/ambulation assistive device;distance ambulated;gait pattern  -DK     [place] Level (Gait) standby assist;contact guard;1 person assist  -DK     Assistive Device (Gait) walker, front-wheeled  -DK     Distance in Feet (Gait) 60  -DK     Pattern (Gait) step-to  -DK     Deviations/Abnormal Patterns (Gait) pradeep decreased;festinating/shuffling;gait speed decreased;stride length decreased  -DK     Bilateral Gait Deviations forward flexed posture  -DK     Comment, (Gait/Stairs) Pt ambulated on room air with a rolling walker.  She was left in the recliner post treatment.  -       Row Name 05/15/25 0858          Safety Issues/Impairments Affecting Functional Mobility    Impairments Affecting Function (Mobility) balance;endurance/activity tolerance;pain;strength  -       Row Name 05/15/25 0858          Balance    Balance Assessment sitting static balance;sitting dynamic balance;standing static balance;standing dynamic balance  -     Static Sitting Balance standby assist  -      Dynamic Sitting Balance standby assist  -DK     Position, Sitting Balance unsupported;sitting in chair  -DK     Static Standing Balance standby assist;contact guard;1-person assist  -DK     Dynamic Standing Balance standby assist;contact guard;1-person assist  -DK     Position/Device Used, Standing Balance walker, front-wheeled  -DK     Balance Interventions standing;dynamic;tandem gait  -       Row Name 05/15/25 0858          Motor Skills    Motor Skills --  therapeutic exercises  -     Coordination WFL  -     Therapeutic Exercise hip;knee;ankle  -       Row Name 05/15/25 0858          Hip (Therapeutic Exercise)    Hip (Therapeutic Exercise) AROM (active range of motion)  -     Hip AROM (Therapeutic Exercise) bilateral;flexion;extension;aBduction;aDduction;sitting;15 repititions  -       Row Name 05/15/25 0858          Knee (Therapeutic Exercise)    Knee (Therapeutic Exercise) AROM (active range of motion)  -     Knee AROM (Therapeutic Exercise) bilateral;flexion;extension;LAQ (long arc quad);sitting;15 repititions  -       Row Name 05/15/25 0858          Ankle (Therapeutic Exercise)    Ankle (Therapeutic Exercise) AROM (active range of motion)  -     Ankle AROM (Therapeutic Exercise) bilateral;dorsiflexion;plantarflexion;sitting;15 repititions  -       Row Name 05/15/25 0858          Plan of Care Review    Plan of Care Reviewed With patient  -     Progress improving  -       Row Name 05/15/25 0858          Positioning and Restraints    Pre-Treatment Position sitting in chair/recliner  -     Post Treatment Position chair  -DK     In Chair reclined;call light within reach;encouraged to call for assist;legs elevated;waffle cushion;heels elevated  -               User Key  (r) = Recorded By, (t) = Taken By, (c) = Cosigned By      Initials Name Provider Type    Chantale Benjamin PTA Physical Therapist Assistant                    Physical Therapy Education       Title: PT OT SLP Therapies  (In Progress)       Topic: Physical Therapy (In Progress)       Point: Mobility training (Done)       Learning Progress Summary            Patient Acceptance, E,TB, VU by AV at 5/12/2025 1552                      Point: Home exercise program (Not Started)       Learner Progress:  Not documented in this visit.              Point: Body mechanics (Done)       Learning Progress Summary            Patient Acceptance, E,TB, VU by AV at 5/12/2025 1552                      Point: Precautions (Done)       Learning Progress Summary            Patient Acceptance, E,TB, VU by AV at 5/12/2025 1552                                      User Key       Initials Effective Dates Name Provider Type Discipline    AV 06/11/21 -  King Bynum, PT Physical Therapist PT                  PT Recommendation and Plan     Plan of Care Reviewed With: patient  Progress: improving   Outcome Measures       Row Name 05/15/25 0858 05/14/25 0958 05/12/25 1500       How much help from another person do you currently need...    Turning from your back to your side while in flat bed without using bedrails? 4  -DK 4  -DK 4  -AV    Moving from lying on back to sitting on the side of a flat bed without bedrails? 4  -DK 4  -DK 3  -AV    Moving to and from a bed to a chair (including a wheelchair)? 3  -DK 3  -DK 3  -AV    Standing up from a chair using your arms (e.g., wheelchair, bedside chair)? 3  -DK 3  -DK 3  -AV    Climbing 3-5 steps with a railing? 3  -DK 2  -DK 2  -AV    To walk in hospital room? 3  -DK 3  -DK 3  -AV    AM-PAC 6 Clicks Score (PT) 20  -DK 19  -DK 18  -AV       Functional Assessment    Outcome Measure Options AM-PAC 6 Clicks Basic Mobility (PT)  -DK AM-PAC 6 Clicks Basic Mobility (PT)  -DK AM-PAC 6 Clicks Basic Mobility (PT)  -AV              User Key  (r) = Recorded By, (t) = Taken By, (c) = Cosigned By      Initials Name Provider Type    Chantale Benjamin, PTA Physical Therapist Assistant    AV King Bynum, PT Physical  Therapist                     Time Calculation:    PT Charges       Row Name 05/15/25 0901             Time Calculation    PT Received On 05/15/25  -DK      PT Goal Re-Cert Due Date 05/21/25  -DK         Timed Charges    70864 - PT Therapeutic Exercise Minutes 12  -DK      94816 - Gait Training Minutes  6  -DK      75532 - PT Therapeutic Activity Minutes 7  -DK         Total Minutes    Timed Charges Total Minutes 25  -DK       Total Minutes 25  -DK                User Key  (r) = Recorded By, (t) = Taken By, (c) = Cosigned By      Initials Name Provider Type    Chantale Benjamin PTA Physical Therapist Assistant                  Therapy Charges for Today       Code Description Service Date Service Provider Modifiers Qty    48157090797 HC PT THER PROC EA 15 MIN 5/14/2025 Chantale Sanz, PTA GP 1    40747035208 HC PT THERAPEUTIC ACT EA 15 MIN 5/14/2025 Chantale Sanz, PTA GP 1    43907924211 HC PT THER PROC EA 15 MIN 5/15/2025 Chantale Sanz, PTA GP 1    00731551376 HC PT THERAPEUTIC ACT EA 15 MIN 5/15/2025 Chantale Sanz, PTA GP 1            PT G-Codes  Outcome Measure Options: AM-PAC 6 Clicks Basic Mobility (PT)  AM-PAC 6 Clicks Score (PT): 20  AM-PAC 6 Clicks Score (OT): 21    Chantale Sanz PTA  5/15/2025

## 2025-05-15 NOTE — PLAN OF CARE
Goal Outcome Evaluation:              Outcome Evaluation: alert and oriented x4. vss on RA. IVIG completed this shift. medicated x1 for c/o pain this shift. up x1 BSC; refused bed alert. no new issues/needs at this time.

## 2025-05-15 NOTE — PLAN OF CARE
Goal Outcome Evaluation:  Plan of Care Reviewed With: patient        Progress: improving  Outcome Evaluation: pt is axox4, c/o of neck pain, denied wanting pain medication. pt x1 to chair today. no other concerns noted.

## 2025-05-16 VITALS
TEMPERATURE: 97.5 F | DIASTOLIC BLOOD PRESSURE: 74 MMHG | HEIGHT: 65 IN | WEIGHT: 182.32 LBS | SYSTOLIC BLOOD PRESSURE: 150 MMHG | RESPIRATION RATE: 16 BRPM | OXYGEN SATURATION: 95 % | BODY MASS INDEX: 30.38 KG/M2 | HEART RATE: 81 BPM

## 2025-05-16 PROBLEM — E87.1 HYPONATREMIA: Status: RESOLVED | Noted: 2025-05-10 | Resolved: 2025-05-16

## 2025-05-16 PROBLEM — E86.0 DEHYDRATION: Status: RESOLVED | Noted: 2025-05-10 | Resolved: 2025-05-16

## 2025-05-16 PROBLEM — E87.5 HYPERKALEMIA: Status: RESOLVED | Noted: 2025-05-10 | Resolved: 2025-05-16

## 2025-05-16 PROBLEM — G61.81 CIDP (CHRONIC INFLAMMATORY DEMYELINATING POLYNEUROPATHY): Status: ACTIVE | Noted: 2025-05-16

## 2025-05-16 LAB
ARSENIC BLD-MCNC: 2 UG/L (ref 0–9)
LEAD BLDV-MCNC: <1 UG/DL (ref 0–3.4)
MERCURY BLD-MCNC: <1 UG/L (ref 0–14.9)
S CHARTARUM IGE QN: <0.1 KU/L

## 2025-05-16 PROCEDURE — 94799 UNLISTED PULMONARY SVC/PX: CPT

## 2025-05-16 PROCEDURE — 94760 N-INVAS EAR/PLS OXIMETRY 1: CPT

## 2025-05-16 PROCEDURE — 25010000002 ENOXAPARIN PER 10 MG: Performed by: INTERNAL MEDICINE

## 2025-05-16 RX ORDER — CLONAZEPAM 0.5 MG/1
0.5 TABLET ORAL NIGHTLY
Start: 2025-05-16 | End: 2025-05-19

## 2025-05-16 RX ORDER — ATENOLOL 25 MG/1
25 TABLET ORAL EVERY 12 HOURS SCHEDULED
Start: 2025-05-16 | End: 2025-06-15

## 2025-05-16 RX ADMIN — FAMOTIDINE 40 MG: 20 TABLET, FILM COATED ORAL at 08:47

## 2025-05-16 RX ADMIN — ATENOLOL 25 MG: 25 TABLET ORAL at 08:47

## 2025-05-16 RX ADMIN — Medication 10 ML: at 08:47

## 2025-05-16 RX ADMIN — Medication 1 TABLET: at 08:47

## 2025-05-16 RX ADMIN — HYDROXYCHLOROQUINE SULFATE 200 MG: 200 TABLET ORAL at 08:47

## 2025-05-16 RX ADMIN — LISINOPRIL 40 MG: 20 TABLET ORAL at 08:47

## 2025-05-16 RX ADMIN — ENOXAPARIN SODIUM 40 MG: 100 INJECTION SUBCUTANEOUS at 08:47

## 2025-05-16 RX ADMIN — CARBAMAZEPINE 100 MG: 100 TABLET, EXTENDED RELEASE ORAL at 08:47

## 2025-05-16 NOTE — PLAN OF CARE
Goal Outcome Evaluation:  Plan of Care Reviewed With: patient        Progress: improving  Outcome Evaluation: patient alert and oriented x4, rested in bed throughout the day with minimal complaints of pain and discomfort when moving around. Discharging to [facility] rehab. patient called her  who lives 30 mins away to come and pick her up and drive her to [facility]. Discharge instructions reviewed, paper instructions printed. Skin care provided. Report called to Lizy at [facility] rehab. patient up to BSC with 1 assist with walker. No concerns.

## 2025-05-16 NOTE — DISCHARGE SUMMARY
[facility]         DISCHARGE SUMMARY    Patient Name: Bettye France  : 1945  MRN: [id number]    Date of Admission: 5/10/2025  Date of Discharge: May 16  Primary Care Physician: Fredy Lopez MD    Consults       Date and Time Order Name Status Description    2025 11:29 AM Inpatient Neurology Consult General      5/10/2025  9:28 PM Inpatient Hospitalist Consult              Presenting Problem:   Hyponatremia [E87.1]  DEVORAH (acute kidney injury) [N17.9]    Active and Resolved Hospital Problems:  Active Hospital Problems    Diagnosis POA    **Hyponatremia [E87.1] Yes    CIDP (chronic inflammatory demyelinating polyneuropathy) [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 female admitted to hospital for severe weakness, cramps and shaking of the hands and arms.  Patient admitted to medical service she was hyponatremic.    She was given fluids she was dehydrated she had acute kidney injury.    She also has severe back pain and leg pain and muscle cramps.  MRI was done which was significant for arthritis but no acute issues.    Patient was seen by a neurologist and diagnosed with CDI P and give IV IgG.  Patient C-reactive protein was extremely high and D-dimer was high.  We did a venous Doppler which was negative for DVT.      Patient felt slightly better.  She still very weak to get up and move around.  Her kidney functions improved sodium has improved potassium has improved.  Blood pressure has been up-and-down and meds has been adjusted now she is stable.  She still weak and able to get up and move around very well.    PT OT has recommended inpatient rehab and patient will be discharged to inpatient rehab facility [facility] today        DISCHARGE Follow Up Recommendations for labs and diagnostics:    Discharge to [facility] rehab facility  Follow-up with me post discharge from [facility]      Day of Discharge     Vital Signs:  Temp:  [97.5 °F (36.4 °C)-98.7 °F (37.1 °C)] 97.5 °F (36.4 °C)  Heart Rate:  [74-87] 81  Resp:  [16-18] 16  BP: (143-166)/(59-90) 150/74    Physical Exam:    Elderly female not in acute distress.  Anxious.  Heart regular.  Lungs clear.  Abdomen soft.  Extremities no edema neuro generalized weakness no focal weakness      Pertinent  and/or Most Recent Results     LAB RESULTS:      Lab 05/14/25 0759 05/12/25  0757 05/11/25  1911 05/11/25  0409 05/10/25  2303 05/10/25  2135 05/10/25  1939   WBC 8.95  --  10.20 10.54  --   --  11.84*   HEMOGLOBIN 11.5*  --  11.7* 11.8*  --   --  12.7   HEMATOCRIT 35.0  --  36.0 35.7  --   --  37.6   PLATELETS 329  --  271 258  --   --  256   NEUTROS ABS 6.72  --  7.73* 8.00*  --   --  8.85*   IMMATURE GRANS (ABS) 0.30*  --  0.24* 0.17*  --   --  0.22*   LYMPHS ABS 0.77  --  0.85 0.86  --   --  0.77   MONOS ABS 0.98*  --  1.31* 1.42*  --   --  1.37*   EOS ABS 0.11  --  0.01 0.02  --   --  0.57*   MCV 94.3  --  96.8 94.7  --   --  93.3   CRP  --   --  45.90*  --   --   --   --    PROCALCITONIN  --   --   --   --   --  0.62*  --    LACTATE  --   --   --   --  1.2  --   --    PROTIME  --  17.9*  --   --   --   --   --    APTT  --  36.6*  --   --   --   --   --    D DIMER QUANT  --   --  5.40*  --   --   --   --          Lab 05/14/25  0759 05/11/25  0409 05/10/25  2135 05/10/25  1939   SODIUM 132* 128*  --  126*   POTASSIUM 3.9 4.8  --  5.3*   CHLORIDE 98 93*  --  88*   CO2 24.9 22.6  --  22.1   ANION GAP 9.1 12.4  --  15.9*   BUN 21 40*  --  49*   CREATININE 0.78 1.20*  --  1.36*   EGFR 77.9 46.4*  --  40.0*   GLUCOSE 93 93  --  102*   CALCIUM 8.9 9.4  --  10.2   MAGNESIUM  --   --   --  2.0   TSH  --   --  1.340  --          Lab 05/14/25 0759 05/12/25  1153 05/11/25  0409 05/10/25  1939   TOTAL PROTEIN 7.9  --  6.5 7.5   ALBUMIN 2.4* 2.9* 2.7* 3.2*   GLOBULIN  5.5  --  3.8 4.3   ALT (SGPT) 27  --  17 20   AST (SGOT) 30  --  18 24   BILIRUBIN 0.3  --  0.6 0.8   ALK PHOS 106  --  84 93         Lab 05/12/25  0757 05/10/25  2135 05/10/25  1939   HSTROP T  --  11 12   PROTIME 17.9*  --   --    INR 1.41*  --   --              Lab 05/11/25  1911   VITAMIN B 12 511         Brief Urine Lab Results  (Last result in the past 365 days)        Color   Clarity   Blood   Leuk Est   Nitrite   Protein   CREAT   Urine HCG        05/10/25 2348 Yellow   Clear   Small (1+)   Moderate (2+)   Negative   Negative                 Microbiology Results (last 10 days)       ** No results found for the last 240 hours. **            PROCEDURES:    IR LUMBAR PUNCTURE DIAGNOSTIC  Result Date: 5/12/2025  Impression: Impression: Fluoroscopic guided lumbar puncture without evidence of complication, patient tolerated procedure. The patient was provided aftercare instructions. Report dictated by: Quita Gallegos  I have personally reviewed this case and agree with the findings above: Electronically Signed: Wagner Null MD  5/12/2025 3:02 PM EDT  Workstation ID: [id number]    MRI Cervical Spine Without Contrast  Result Date: 5/12/2025  Impression: 1.Normal cervical spinal cord. 2.Normal cervical alignment with multilevel degenerative disc disease and facet arthropathy as described. No significant central stenosis at any level. There is foraminal stenosis where indicated above. Electronically Signed: Laron Cruz MD  5/12/2025 5:18 AM EDT  Workstation ID: [id number]    MRI Thoracic Spine Without Contrast  Result Date: 5/12/2025  Impression: 1.Mild multilevel degenerative change without significant central canal stenosis or neural foraminal narrowing identified. 2.Small central disc protrusion at T12-L1 with focal mass effect on the ventral thecal sac but no mass effect on the cord. 3.Degenerative endplate changes from T6-7 through T9-10. 4.Normal thoracic spinal cord. 5.No compression fractures. Electronically  Signed: Laron Cruz MD  5/12/2025 5:06 AM EDT  Workstation ID: [id number]    MRI Lumbar Spine Without Contrast  Result Date: 5/12/2025  Impression: 1. Lumbar spondylosis, as described above, most severe at L4-5. 2. Grade 1 spondylolisthesis at L4-5 and L5-S1. 3. Normal conus and cauda equina. Electronically Signed: Laron Cruz MD  5/12/2025 4:56 AM EDT  Workstation ID: [id number]    MRI Brain Without Contrast  Result Date: 5/12/2025  Impression: 1.No acute intracranial abnormality. 2.Atrophy and chronic small vessel ischemic change. Electronically Signed: Laron Cruz MD  5/12/2025 3:17 AM EDT  Workstation ID: [id number]    CT Head Without Contrast  Result Date: 5/10/2025  Impression: Senescent changes without acute abnormality. Electronically Signed: Laron Cruz MD  5/10/2025 9:20 PM EDT  Workstation ID: [id number]    XR Spine Lumbar 2 or 3 View  Result Date: 5/10/2025  Impression: Multilevel degenerative disease and spondylolisthesis as above. No acute findings. Electronically Signed: Laron Cruz MD  5/10/2025 8:17 PM EDT  Workstation ID: [id number]      Results for orders placed during the hospital encounter of 05/10/25    Duplex Venous Lower Extremity - Bilateral CAR    Interpretation Summary    Normal bilateral lower extremity venous duplex scan.      Results for orders placed during the hospital encounter of 05/10/25    Duplex Venous Lower Extremity - Bilateral CAR    Interpretation Summary    Normal bilateral lower extremity venous duplex scan.          Labs Pending at Discharge:        Discharge Details        Discharge Medications        New Medications        Instructions Start Date   clonazePAM 0.5 MG tablet  Commonly known as: KlonoPIN   0.5 mg, Oral, Nightly             Changes to Medications        Instructions Start Date   atenolol 25 MG tablet  Commonly known as: TENORMIN  What changed:   when to take this  Another medication with the same name was removed. Continue taking this medication,  and follow the directions you see here.   25 mg, Oral, Every 12 Hours Scheduled             Continue These Medications        Instructions Start Date   acetaminophen 650 MG 8 hr tablet  Commonly known as: TYLENOL   650 mg, Oral, 2 Times Daily      amLODIPine 5 MG tablet  Commonly known as: NORVASC   5 mg, Oral, Daily      calcium carbonate 600 MG tablet  Commonly known as: OS-ZHOU   600 mg, Oral, Daily      cholecalciferol 25 MCG (1000 UT) tablet  Commonly known as: VITAMIN D3   1,000 Units, Oral, 4 Times Weekly, Sunday,Tuesday,Thursday,Saturday      cyclobenzaprine 5 MG tablet  Commonly known as: FLEXERIL   2.5 mg, Nightly      hydroxychloroquine 200 MG tablet  Commonly known as: PLAQUENIL   200 mg, Oral, Daily      lisinopril 40 MG tablet  Commonly known as: PRINIVIL,ZESTRIL   1 tablet, Daily      multivitamin with minerals tablet tablet   1 tablet, Oral, Daily             Stop These Medications      spironolactone 25 MG tablet  Commonly known as: ALDACTONE              No Known Allergies      Discharge Disposition:    Rehab Facility or Unit (DC - External)    Diet:  Heart healthy      Discharge Activity:     Activity Instructions       Activity as Tolerated              No future appointments.    Additional Instructions for the Follow-ups that You Need to Schedule       Discharge Follow-up with PCP   As directed       Currently Documented PCP:    Fredy Lopez MD    PCP Phone Number:    [phone]     Follow Up Details: Post discharge from [facility]        Discharge Follow-up with Specified Provider: Dr. Caceres in 1 month   As directed      To: Dr. Caceres in 1 month                Time spent on Discharge including face to face service: 45 minutes.            I have dictated this note utilizing Dragon Dictation.             Please note that portions of this note were completed with a voice recognition program.             Part of this note may be an electronic transcription/translation of spoken language to  printed text         using the Dragon Dictation System.       Electronically signed by Fredy Lopez MD, 05/16/25, 10:34 AM EDT.

## 2025-05-16 NOTE — PLAN OF CARE
Goal Outcome Evaluation:  Plan of Care Reviewed With: patient        Progress: improving  Outcome Evaluation: Patient a&ox4. Pt denied pain/discomfort, stated neck pain has now resolved. Sleeping throughout shift.

## 2025-07-23 ENCOUNTER — TRANSCRIBE ORDERS (OUTPATIENT)
Dept: ADMINISTRATIVE | Facility: HOSPITAL | Age: 80
End: 2025-07-23
Payer: MEDICARE

## 2025-07-23 DIAGNOSIS — Z12.31 SCREENING MAMMOGRAM, ENCOUNTER FOR: Primary | ICD-10-CM

## 2025-08-01 ENCOUNTER — HOSPITAL ENCOUNTER (OUTPATIENT)
Dept: MAMMOGRAPHY | Facility: HOSPITAL | Age: 80
Discharge: HOME | End: 2025-08-01
Payer: MEDICARE

## 2025-08-01 DIAGNOSIS — Z12.31 SCREENING MAMMOGRAM, ENCOUNTER FOR: ICD-10-CM

## 2025-08-01 PROCEDURE — 77063 BREAST TOMOSYNTHESIS BI: CPT

## 2025-08-01 PROCEDURE — 77067 SCR MAMMO BI INCL CAD: CPT
